# Patient Record
Sex: FEMALE | Race: WHITE | NOT HISPANIC OR LATINO | Employment: FULL TIME | URBAN - METROPOLITAN AREA
[De-identification: names, ages, dates, MRNs, and addresses within clinical notes are randomized per-mention and may not be internally consistent; named-entity substitution may affect disease eponyms.]

---

## 2022-09-20 ENCOUNTER — APPOINTMENT (OUTPATIENT)
Dept: RADIOLOGY | Facility: MEDICAL CENTER | Age: 32
DRG: 779 | End: 2022-09-20
Attending: EMERGENCY MEDICINE
Payer: COMMERCIAL

## 2022-09-20 ENCOUNTER — APPOINTMENT (OUTPATIENT)
Dept: RADIOLOGY | Facility: MEDICAL CENTER | Age: 32
DRG: 779 | End: 2022-09-20
Attending: OBSTETRICS & GYNECOLOGY
Payer: COMMERCIAL

## 2022-09-20 ENCOUNTER — HOSPITAL ENCOUNTER (INPATIENT)
Facility: MEDICAL CENTER | Age: 32
LOS: 1 days | DRG: 779 | End: 2022-09-21
Attending: EMERGENCY MEDICINE | Admitting: OBSTETRICS & GYNECOLOGY
Payer: COMMERCIAL

## 2022-09-20 DIAGNOSIS — D62 ACUTE BLOOD LOSS ANEMIA: ICD-10-CM

## 2022-09-20 DIAGNOSIS — N93.9 VAGINAL BLEEDING: ICD-10-CM

## 2022-09-20 DIAGNOSIS — O20.9 VAGINAL BLEEDING IN PREGNANCY, FIRST TRIMESTER: ICD-10-CM

## 2022-09-20 PROBLEM — O03.9 MISCARRIAGE: Status: ACTIVE | Noted: 2022-09-20

## 2022-09-20 LAB
ABO + RH BLD: NORMAL
ABO GROUP BLD: NORMAL
ALBUMIN SERPL BCP-MCNC: 3.8 G/DL (ref 3.2–4.9)
ALBUMIN/GLOB SERPL: 1.5 G/DL
ALP SERPL-CCNC: 34 U/L (ref 30–99)
ALT SERPL-CCNC: 41 U/L (ref 2–50)
ANION GAP SERPL CALC-SCNC: 12 MMOL/L (ref 7–16)
AST SERPL-CCNC: 18 U/L (ref 12–45)
B-HCG SERPL-ACNC: ABNORMAL MIU/ML (ref 0–5)
BASOPHILS # BLD AUTO: 0.4 % (ref 0–1.8)
BASOPHILS # BLD: 0.04 K/UL (ref 0–0.12)
BILIRUB SERPL-MCNC: 0.3 MG/DL (ref 0.1–1.5)
BLD GP AB SCN SERPL QL: NORMAL
BUN SERPL-MCNC: 8 MG/DL (ref 8–22)
CALCIUM SERPL-MCNC: 8.9 MG/DL (ref 8.5–10.5)
CHLORIDE SERPL-SCNC: 106 MMOL/L (ref 96–112)
CO2 SERPL-SCNC: 19 MMOL/L (ref 20–33)
CREAT SERPL-MCNC: 0.68 MG/DL (ref 0.5–1.4)
EOSINOPHIL # BLD AUTO: 0.07 K/UL (ref 0–0.51)
EOSINOPHIL NFR BLD: 0.8 % (ref 0–6.9)
ERYTHROCYTE [DISTWIDTH] IN BLOOD BY AUTOMATED COUNT: 41.1 FL (ref 35.9–50)
ERYTHROCYTE [DISTWIDTH] IN BLOOD BY AUTOMATED COUNT: 41.3 FL (ref 35.9–50)
GFR SERPLBLD CREATININE-BSD FMLA CKD-EPI: 117 ML/MIN/1.73 M 2
GLOBULIN SER CALC-MCNC: 2.5 G/DL (ref 1.9–3.5)
GLUCOSE SERPL-MCNC: 129 MG/DL (ref 65–99)
HCT VFR BLD AUTO: 27.8 % (ref 37–47)
HCT VFR BLD AUTO: 32.5 % (ref 37–47)
HGB BLD-MCNC: 11 G/DL (ref 12–16)
HGB BLD-MCNC: 9.6 G/DL (ref 12–16)
IMM GRANULOCYTES # BLD AUTO: 0.06 K/UL (ref 0–0.11)
IMM GRANULOCYTES NFR BLD AUTO: 0.7 % (ref 0–0.9)
LYMPHOCYTES # BLD AUTO: 1.54 K/UL (ref 1–4.8)
LYMPHOCYTES NFR BLD: 16.8 % (ref 22–41)
MCH RBC QN AUTO: 27.6 PG (ref 27–33)
MCH RBC QN AUTO: 28.2 PG (ref 27–33)
MCHC RBC AUTO-ENTMCNC: 33.8 G/DL (ref 33.6–35)
MCHC RBC AUTO-ENTMCNC: 34.5 G/DL (ref 33.6–35)
MCV RBC AUTO: 81.5 FL (ref 81.4–97.8)
MCV RBC AUTO: 81.7 FL (ref 81.4–97.8)
MONOCYTES # BLD AUTO: 0.39 K/UL (ref 0–0.85)
MONOCYTES NFR BLD AUTO: 4.3 % (ref 0–13.4)
NEUTROPHILS # BLD AUTO: 7.06 K/UL (ref 2–7.15)
NEUTROPHILS NFR BLD: 77 % (ref 44–72)
NRBC # BLD AUTO: 0 K/UL
NRBC BLD-RTO: 0 /100 WBC
NUMBER OF RH DOSES IND 8505RD: NORMAL
PLATELET # BLD AUTO: 301 K/UL (ref 164–446)
PLATELET # BLD AUTO: 346 K/UL (ref 164–446)
PMV BLD AUTO: 9.6 FL (ref 9–12.9)
PMV BLD AUTO: 9.7 FL (ref 9–12.9)
POTASSIUM SERPL-SCNC: 3.3 MMOL/L (ref 3.6–5.5)
PROT SERPL-MCNC: 6.3 G/DL (ref 6–8.2)
RBC # BLD AUTO: 3.41 M/UL (ref 4.2–5.4)
RBC # BLD AUTO: 3.98 M/UL (ref 4.2–5.4)
RH BLD: NORMAL
RH BLD: NORMAL
SODIUM SERPL-SCNC: 137 MMOL/L (ref 135–145)
WBC # BLD AUTO: 12.1 K/UL (ref 4.8–10.8)
WBC # BLD AUTO: 9.2 K/UL (ref 4.8–10.8)

## 2022-09-20 PROCEDURE — 86901 BLOOD TYPING SEROLOGIC RH(D): CPT

## 2022-09-20 PROCEDURE — 700111 HCHG RX REV CODE 636 W/ 250 OVERRIDE (IP): Performed by: EMERGENCY MEDICINE

## 2022-09-20 PROCEDURE — 76801 OB US < 14 WKS SINGLE FETUS: CPT

## 2022-09-20 PROCEDURE — 85027 COMPLETE CBC AUTOMATED: CPT

## 2022-09-20 PROCEDURE — 96374 THER/PROPH/DIAG INJ IV PUSH: CPT

## 2022-09-20 PROCEDURE — 80053 COMPREHEN METABOLIC PANEL: CPT

## 2022-09-20 PROCEDURE — 85025 COMPLETE CBC W/AUTO DIFF WBC: CPT

## 2022-09-20 PROCEDURE — 86900 BLOOD TYPING SEROLOGIC ABO: CPT

## 2022-09-20 PROCEDURE — 700102 HCHG RX REV CODE 250 W/ 637 OVERRIDE(OP): Performed by: EMERGENCY MEDICINE

## 2022-09-20 PROCEDURE — 86850 RBC ANTIBODY SCREEN: CPT

## 2022-09-20 PROCEDURE — 99285 EMERGENCY DEPT VISIT HI MDM: CPT

## 2022-09-20 PROCEDURE — 36415 COLL VENOUS BLD VENIPUNCTURE: CPT

## 2022-09-20 PROCEDURE — 700105 HCHG RX REV CODE 258: Performed by: EMERGENCY MEDICINE

## 2022-09-20 PROCEDURE — 770001 HCHG ROOM/CARE - MED/SURG/GYN PRIV*

## 2022-09-20 PROCEDURE — A9270 NON-COVERED ITEM OR SERVICE: HCPCS | Performed by: EMERGENCY MEDICINE

## 2022-09-20 PROCEDURE — 84702 CHORIONIC GONADOTROPIN TEST: CPT

## 2022-09-20 RX ORDER — ACETAMINOPHEN 500 MG
1000 TABLET ORAL ONCE
Status: COMPLETED | OUTPATIENT
Start: 2022-09-20 | End: 2022-09-20

## 2022-09-20 RX ORDER — ONDANSETRON 2 MG/ML
4 INJECTION INTRAMUSCULAR; INTRAVENOUS ONCE
Status: COMPLETED | OUTPATIENT
Start: 2022-09-20 | End: 2022-09-20

## 2022-09-20 RX ORDER — SODIUM CHLORIDE 9 MG/ML
INJECTION, SOLUTION INTRAVENOUS CONTINUOUS
Status: DISCONTINUED | OUTPATIENT
Start: 2022-09-20 | End: 2022-09-21 | Stop reason: HOSPADM

## 2022-09-20 RX ADMIN — ONDANSETRON 4 MG: 2 INJECTION INTRAMUSCULAR; INTRAVENOUS at 17:28

## 2022-09-20 RX ADMIN — SODIUM CHLORIDE: 9 INJECTION, SOLUTION INTRAVENOUS at 19:00

## 2022-09-20 RX ADMIN — ACETAMINOPHEN 1000 MG: 500 TABLET ORAL at 17:29

## 2022-09-20 RX ADMIN — SODIUM CHLORIDE: 9 INJECTION, SOLUTION INTRAVENOUS at 17:00

## 2022-09-20 ASSESSMENT — LIFESTYLE VARIABLES
ALCOHOL_USE: NO
HAVE YOU EVER FELT YOU SHOULD CUT DOWN ON YOUR DRINKING: NO
EVER HAD A DRINK FIRST THING IN THE MORNING TO STEADY YOUR NERVES TO GET RID OF A HANGOVER: NO
HOW MANY TIMES IN THE PAST YEAR HAVE YOU HAD 5 OR MORE DRINKS IN A DAY: 0
CONSUMPTION TOTAL: NEGATIVE
ON A TYPICAL DAY WHEN YOU DRINK ALCOHOL HOW MANY DRINKS DO YOU HAVE: 0
TOTAL SCORE: 0
EVER FELT BAD OR GUILTY ABOUT YOUR DRINKING: NO
AVERAGE NUMBER OF DAYS PER WEEK YOU HAVE A DRINK CONTAINING ALCOHOL: 0
HAVE PEOPLE ANNOYED YOU BY CRITICIZING YOUR DRINKING: NO

## 2022-09-20 ASSESSMENT — COGNITIVE AND FUNCTIONAL STATUS - GENERAL
SUGGESTED CMS G CODE MODIFIER DAILY ACTIVITY: CH
MOBILITY SCORE: 24
DAILY ACTIVITIY SCORE: 24
SUGGESTED CMS G CODE MODIFIER MOBILITY: CH

## 2022-09-20 ASSESSMENT — PATIENT HEALTH QUESTIONNAIRE - PHQ9
2. FEELING DOWN, DEPRESSED, IRRITABLE, OR HOPELESS: NOT AT ALL
1. LITTLE INTEREST OR PLEASURE IN DOING THINGS: NOT AT ALL
SUM OF ALL RESPONSES TO PHQ9 QUESTIONS 1 AND 2: 0

## 2022-09-20 ASSESSMENT — PAIN DESCRIPTION - PAIN TYPE: TYPE: ACUTE PAIN

## 2022-09-20 ASSESSMENT — PAIN SCALES - WONG BAKER: WONGBAKER_NUMERICALRESPONSE: DOESN'T HURT AT ALL

## 2022-09-20 NOTE — ED PROVIDER NOTES
"ED Provider Note    CHIEF COMPLAINT  Chief Complaint   Patient presents with    Vaginal Bleeding       HPI  Martha Carissa Rubio is a 32 y.o. female who presents complaining vaginal bleeding.  She is a G4,  (molar) at 6 and half weeks by dates.  She had some bleeding and was seen a week ago but no [facility].  There she had a gestational sac with no fetal pole identified in the uterus, 5 weeks 3 days.  She had an associated small subchorionic hemorrhage.  Her bleeding got better but then got worse once again, she was presents here concerned about that bleeding.  She has not really been able to quantify it but it is more than the menstrual cycle.  She denies any fever.  She does have some dizziness when she stands.  Denies any change in bowel or bladder.  There is no other complaint.    PAST MEDICAL HISTORY  As above    FAMILY HISTORY  History reviewed. No pertinent family history.    SOCIAL HISTORY  Social History     Tobacco Use    Smoking status: Never    Smokeless tobacco: Never   Substance Use Topics    Alcohol use: Not Currently    Drug use: Not Currently       SURGICAL HISTORY  History reviewed. No pertinent surgical history.    CURRENT MEDICATIONS  Home Medications       Reviewed by Natalie Howe R.N. (Registered Nurse) on 22 at 1252  Med List Status: Complete     Medication Last Dose Status        Patient Nuno Taking any Medications                           I have reviewed the nurses notes and/or the list brought with the patient.    ALLERGIES  Allergies   Allergen Reactions    Codeine     Penicillins        REVIEW OF SYSTEMS  See HPI for further details. Review of systems as above, otherwise all other systems are negative.     PHYSICAL EXAM  VITAL SIGNS: /61   Pulse 95   Temp 36.3 °C (97.4 °F) (Temporal)   Resp 16   Ht 1.676 m (5' 6\")   Wt 107 kg (236 lb 5.3 oz)   LMP 2022 (Exact Date)   SpO2 95%   BMI 38.15 kg/m²     Constitutional: Well appearing patient " in no acute distress.  Not toxic, nor ill in appearance.  HENT: Mucus membranes moist.  Oropharynx is clear.  Eyes: Pupils equally round.  No scleral icterus.   Neck: Full nontender range of motion.  Lymphatic: No cervical lymphadenopathy noted.   Cardiovascular: Tachycardia is noted in triage.  At the bedside heart rate is  on the monitor.  No murmurs, rubs, nor gallop appreciated.   Thorax & Lungs: Chest is nontender.  Lungs are clear to auscultation with good air movement bilaterally.  No wheeze, rhonchi, nor rales.   Abdomen: Soft, with no tenderness, rebound nor guarding.  No mass, pulsatile mass, nor hepatosplenomegaly appreciated.  : Ros[employer] tech as chaperone.  Normal external genitalia.  There is blood in the vault.  The cervix is closed.  There is no cervical motion tenderness.  There is no adnexal mass or tenderness.  Skin: No purpura nor petechia noted.  Extremities/Musculoskeletal: No sign of trauma.  Calves are nontender with no cords nor edema.  No Justin's sign.  Pulses are intact all around.   Neurologic: Alert & oriented.  Strength and sensation is intact all around.  Gait is normal.  Psychiatric: Normal affect appropriate for the clinical situation.    LABS  Labs Reviewed   CBC WITH DIFFERENTIAL - Abnormal; Notable for the following components:       Result Value    RBC 3.98 (*)     Hemoglobin 11.0 (*)     Hematocrit 32.5 (*)     Neutrophils-Polys 77.00 (*)     Lymphocytes 16.80 (*)     All other components within normal limits   COMP METABOLIC PANEL - Abnormal; Notable for the following components:    Potassium 3.3 (*)     Co2 19 (*)     Glucose 129 (*)     All other components within normal limits   HCG QUANTITATIVE - Abnormal; Notable for the following components:    Bhcg 46378.0 (*)     All other components within normal limits   RH TYPE FOR RHOGAM FROM E.D.    Narrative:     Print Consent?->No   ESTIMATED GFR   URINALYSIS,CULTURE IF INDICATED   COD (ADULT)          RADIOLOGY/PROCEDURES  I have reviewed the patient's film interpretations myself, and they are read out by the radiologist as:   US-OB 1ST TRIMESTER WITH TRANSVAGINAL (COMBO)   Final Result      1.  Single living intrauterine pregnancy within an irregularly shaped gestational sac located within the cervix suggesting  in progress versus cervical ectopic.   2.  Additional cystic structures/sac within the uterus with questionable small internal contents for which additional pregnancy cannot be excluded. Recommend second look ultrasound at this area. There is possibly subchorionic hemorrhage adjacent to this.   These findings were discussed with TYRESE DEAN on 2022 3:30 PM.           .    MEDICAL RECORD  I have reviewed patient's medical record and pertinent results are listed above.    COURSE & MEDICAL DECISION MAKING  I have reviewed any medical record information, laboratory studies and radiographic results as noted above.  Patient presents about 6 weeks by dates, ultrasound a week ago showing gestational sac but no fetal pole.  She presents here with increasing bleeding.  Her hCG has risen to 98,000 from 30,000.  But her hematocrit is dropped from 40.5-32.5.  I got the results as noted above the radiologist is concerned about either miscarriage in process versus cervical ectopic.  Given this with her blood loss, I called discussed the patient's case with Dr. Alexander from gynecology.  She will be by to see the patient.  At this time disposition is pending her input.  It sounds like radiology also wants a more films, I will defer that to them.     At this time the patient is placed into ED observation status, and her disposition will be based upon Dr. Alexander's assessment.    For now, have asked the patient be kept n.p.o., we will go ahead and put an IV in and start IV fluids.    I discussed all this with the patient, who expresses understanding.  Her  who I initially met was not at the  bedside as he went home to take care of the kids.      FINAL IMPRESSION  1. Vaginal bleeding    2. Acute blood loss anemia    3.  Miscarriage in process versus cervical ectopic pregnancy     This dictation was created using voice recognition software.    Electronically signed by: Champ Moon M.D., 9/20/2022 3:44 PM

## 2022-09-20 NOTE — DISCHARGE SUMMARY
ED Observation Discharge Summary  Patient:Martha Rubio  Patient : 1990  Patient MRN: [id number]  Patient PCP: Pcp Pt States None    Admit Date: 2022  Discharge Date and Time: 22 4:58 PM  Discharge Diagnosis: vaginal bleeding, miscarriage, anemia  Discharge Attending: Glendy Paz M.D.  Discharge Service: ED Observation    ED Course  Patient is a 32-year-old woman  4, para 2 A1 (molar) about 6 and half weeks presenting with heavy vaginal bleeding and lightheadedness.  Patient is signed out to me for follow-up by Dr. Moon, patient has been placed in ED opts for OB evaluation and repeat H&H.  Patient was seen at [facility] 2022 for vaginal bleeding and clots.  At that time, H&H was 13.4/40.5, Rh+, beta hCG 30,921, ultrasound showed gestational sac with yolk sac and no fetal pole identified with an estimated age of pregnancy about 5 weeks and 3 days.  There was a small subchorionic hemorrhage at that time.  Today's labs show a decrease in her H&H at 11/32.5.  Patient had been complaining of lightheadedness and fatigue.  She did fall but did not pass out earlier today.  Radiology reviewed ultrasound and shows a viable IUP as well as a possible cervical ectopic pregnancy.  Dr. Aubrie Alexander did evaluate the patient and reviewed the ultrasound, she feels that this is likely a twin gestation with 1 actively miscarrying and 1 remaining currently viable.  She would like to continue IV fluids and repeat H&H.  If patient's H&H remained stable and vital signs remain normal with patient asymptomatic, she feels comfortable with discharge and follow-up in her clinic in the next 1 to 2 days.  Patient was given 1 L IV fluids.  Patient did appear pale but improved with IV fluids.  Repeat H&H with continued drop at 9.6/27.8.  Heart rate normal, blood pressure normal, patient has improved symptoms and states that her last urination there was not as much blood  "as previous.  I am still concerned with the continued drop of her H&H and feel that admission and observation is appropriate.  Patient is comfortable this plan.  Discussed with Dr. Alexander, OB/GYN, who agrees with admission.  Patient is admitted to the OB/GYN service in stable condition.    Discharge Exam:  /50   Pulse 85   Temp 36.3 °C (97.4 °F) (Temporal)   Resp 16   Ht 1.676 m (5' 6\")   Wt 107 kg (236 lb 5.3 oz)   LMP 08/08/2022 (Exact Date)   SpO2 95%   BMI 38.15 kg/m² .    Constitutional: Awake and alert. Nontoxic  HENT: Atraumatic, mucous murmurs are moist  Eyes: No icterus  Neck: Supple  Cardiovascular: Normal heart rate   Thorax & Lungs: Breathing easily  Abdomen: Nontender  Skin:  No visible rash  Extremities: Well perfused  Psychiatric: Affect normal    Labs  Results for orders placed or performed during the hospital encounter of 09/20/22   CBC WITH DIFFERENTIAL   Result Value Ref Range    WBC 9.2 4.8 - 10.8 K/uL    RBC 3.98 (L) 4.20 - 5.40 M/uL    Hemoglobin 11.0 (L) 12.0 - 16.0 g/dL    Hematocrit 32.5 (L) 37.0 - 47.0 %    MCV 81.7 81.4 - 97.8 fL    MCH 27.6 27.0 - 33.0 pg    MCHC 33.8 33.6 - 35.0 g/dL    RDW 41.1 35.9 - 50.0 fL    Platelet Count 346 164 - 446 K/uL    MPV 9.7 9.0 - 12.9 fL    Neutrophils-Polys 77.00 (H) 44.00 - 72.00 %    Lymphocytes 16.80 (L) 22.00 - 41.00 %    Monocytes 4.30 0.00 - 13.40 %    Eosinophils 0.80 0.00 - 6.90 %    Basophils 0.40 0.00 - 1.80 %    Immature Granulocytes 0.70 0.00 - 0.90 %    Nucleated RBC 0.00 /100 WBC    Neutrophils (Absolute) 7.06 2.00 - 7.15 K/uL    Lymphs (Absolute) 1.54 1.00 - 4.80 K/uL    Monos (Absolute) 0.39 0.00 - 0.85 K/uL    Eos (Absolute) 0.07 0.00 - 0.51 K/uL    Baso (Absolute) 0.04 0.00 - 0.12 K/uL    Immature Granulocytes (abs) 0.06 0.00 - 0.11 K/uL    NRBC (Absolute) 0.00 K/uL   COMP METABOLIC PANEL   Result Value Ref Range    Sodium 137 135 - 145 mmol/L    Potassium 3.3 (L) 3.6 - 5.5 mmol/L    Chloride 106 96 - 112 mmol/L    Co2 " 19 (L) 20 - 33 mmol/L    Anion Gap 12.0 7.0 - 16.0    Glucose 129 (H) 65 - 99 mg/dL    Bun 8 8 - 22 mg/dL    Creatinine 0.68 0.50 - 1.40 mg/dL    Calcium 8.9 8.5 - 10.5 mg/dL    AST(SGOT) 18 12 - 45 U/L    ALT(SGPT) 41 2 - 50 U/L    Alkaline Phosphatase 34 30 - 99 U/L    Total Bilirubin 0.3 0.1 - 1.5 mg/dL    Albumin 3.8 3.2 - 4.9 g/dL    Total Protein 6.3 6.0 - 8.2 g/dL    Globulin 2.5 1.9 - 3.5 g/dL    A-G Ratio 1.5 g/dL   RH TYPE FOR RHOGAM FROM E.D.   Result Value Ref Range    Emergency Department Rh Typing POS     Number Of Rh Doses Indicated ZERO    HCG QUANTITATIVE   Result Value Ref Range    Bhcg 13212.0 (H) 0.0 - 5.0 mIU/mL   ESTIMATED GFR   Result Value Ref Range    GFR (CKD-EPI) 117 >60 mL/min/1.73 m 2   COD (ADULT)   Result Value Ref Range    ABO Grouping Only A     Rh Grouping Only POS     Antibody Screen-Cod NEG    ABO Rh Confirm   Result Value Ref Range    ABO Rh Confirm A POS    CBC WITHOUT DIFFERENTIAL   Result Value Ref Range    WBC 12.1 (H) 4.8 - 10.8 K/uL    RBC 3.41 (L) 4.20 - 5.40 M/uL    Hemoglobin 9.6 (L) 12.0 - 16.0 g/dL    Hematocrit 27.8 (L) 37.0 - 47.0 %    MCV 81.5 81.4 - 97.8 fL    MCH 28.2 27.0 - 33.0 pg    MCHC 34.5 33.6 - 35.0 g/dL    RDW 41.3 35.9 - 50.0 fL    Platelet Count 301 164 - 446 K/uL    MPV 9.6 9.0 - 12.9 fL       Radiology  US-OB TWINS-1ST TRIMESTER   Final Result   Addendum (preliminary) 2 of 2   Addendum:      Repeat imaging of the intrauterine sac demonstrates a more    normal-appearing gestational sac containing a yolk sac and fetal pole.    Crown-rump length measures 0.43 cm projecting to a 6 week 1 day gestation.    Fetal heart rate 117 bpm. There is a small    subchorionic hemorrhage adjacent to this.      These findings were discussed with Dr Paz on 2022 4:31 PM.      Final      1.  Single living intrauterine pregnancy within an irregularly shaped gestational sac located within the cervix suggesting  in progress versus cervical ectopic.   2.   Additional cystic structures/sac within the uterus with questionable small internal contents for which additional pregnancy cannot be excluded. Recommend second look ultrasound at this area. There is possibly subchorionic hemorrhage adjacent to this.   These findings were discussed with TYRESE DEAN on 9/20/2022 3:30 PM.             Disposition: Admit to OB/GYN service under Dr. Alexander    Follow up: No follow-ups on file.    Medications:   New Prescriptions    No medications on file       Discharge Condition: Stable    Electronically signed by: Glendy Paz M.D., 9/20/2022 4:58 PM

## 2022-09-20 NOTE — PROGRESS NOTES
Spiritual Care Note    Patient Information     Patient's Name: Martha Rubio   MRN: [id number]    YOB: 1990   Age and Gender: 32 y.o. female   Service Area: ED C   Room (and Bed): [address]   Ethnicity or Nationality:     Primary Language: English   Yazdanism/Spiritual preference: Denominational   Place of Residence: [place]   Family/Friends/Others Present: No   Clinical Team Present: No   Medical Diagnosis(-es)/Procedure(s):    Code Status: No Order    Date of Admission: 9/20/2022   Length of Stay: 0 days        Spiritual Care Provider Information:  Name of Spiritual Care Provider: Clarissa Ortiz  Title of Spiritual Care Provider: Volunteer   Phone Number: [phone]  E-mail: [email or web address]   Total time : 10 minutes    Spiritual Screen Results:    Gen Nursing        Palliative Care         Encounter/Request Information  Encounter/Request Type   Visited With: Patient  Nature of the Visit: Initial, On shift  General Visit: Yes  Referral From/ Origin of Request: [place] rounds, Verbal patient    Religous Needs/Values  Yazdanism Needs Visit  Yazdanism Needs: Prayer    Spiritual Assessment   Spiritual Care Encounters  Observations/Symptoms: Accepting, Thankfulness  Interacton/Conversation: Pt requested prayer for herself and her unborn child, and thanked the .  Assessment: Need  Need: Seeking Spiritual Assistance and Support  Interventions: Compassionate presence, prayer.  Outcomes: Spiritual Comfort  Plan: Visit Upon Request    Notes:

## 2022-09-20 NOTE — ED TRIAGE NOTES
Pt ambulatory to triage c/o vaginal bleeding that began last week and went to [facility] diagnosed with subchorionic hemorrhge (pt is approx 6 weeks pregnant) and dc'd home. Pt states yesterday began to bleed more heavily and passing large clots. Pt denies pain. Pt states she is unsure of how many pads she is going through an hour. Pt given 2 additional pads if needed and told to let me know if she goes through them within the hour. Nad.

## 2022-09-21 VITALS
OXYGEN SATURATION: 90 % | BODY MASS INDEX: 37.98 KG/M2 | HEIGHT: 66 IN | DIASTOLIC BLOOD PRESSURE: 58 MMHG | RESPIRATION RATE: 18 BRPM | SYSTOLIC BLOOD PRESSURE: 100 MMHG | TEMPERATURE: 97.5 F | WEIGHT: 236.33 LBS | HEART RATE: 87 BPM

## 2022-09-21 LAB
BASOPHILS # BLD AUTO: 0.4 % (ref 0–1.8)
BASOPHILS # BLD: 0.04 K/UL (ref 0–0.12)
EOSINOPHIL # BLD AUTO: 0.06 K/UL (ref 0–0.51)
EOSINOPHIL NFR BLD: 0.6 % (ref 0–6.9)
ERYTHROCYTE [DISTWIDTH] IN BLOOD BY AUTOMATED COUNT: 42.4 FL (ref 35.9–50)
ERYTHROCYTE [DISTWIDTH] IN BLOOD BY AUTOMATED COUNT: 42.9 FL (ref 35.9–50)
HCT VFR BLD AUTO: 24.5 % (ref 37–47)
HCT VFR BLD AUTO: 26.2 % (ref 37–47)
HGB BLD-MCNC: 8.2 G/DL (ref 12–16)
HGB BLD-MCNC: 8.9 G/DL (ref 12–16)
IMM GRANULOCYTES # BLD AUTO: 0.08 K/UL (ref 0–0.11)
IMM GRANULOCYTES NFR BLD AUTO: 0.8 % (ref 0–0.9)
LYMPHOCYTES # BLD AUTO: 1.64 K/UL (ref 1–4.8)
LYMPHOCYTES NFR BLD: 16.2 % (ref 22–41)
MCH RBC QN AUTO: 27.9 PG (ref 27–33)
MCH RBC QN AUTO: 28.1 PG (ref 27–33)
MCHC RBC AUTO-ENTMCNC: 33.5 G/DL (ref 33.6–35)
MCHC RBC AUTO-ENTMCNC: 34 G/DL (ref 33.6–35)
MCV RBC AUTO: 82.6 FL (ref 81.4–97.8)
MCV RBC AUTO: 83.3 FL (ref 81.4–97.8)
MONOCYTES # BLD AUTO: 0.84 K/UL (ref 0–0.85)
MONOCYTES NFR BLD AUTO: 8.3 % (ref 0–13.4)
NEUTROPHILS # BLD AUTO: 7.48 K/UL (ref 2–7.15)
NEUTROPHILS NFR BLD: 73.7 % (ref 44–72)
NRBC # BLD AUTO: 0 K/UL
NRBC BLD-RTO: 0 /100 WBC
PLATELET # BLD AUTO: 236 K/UL (ref 164–446)
PLATELET # BLD AUTO: 268 K/UL (ref 164–446)
PMV BLD AUTO: 9.1 FL (ref 9–12.9)
PMV BLD AUTO: 9.5 FL (ref 9–12.9)
RBC # BLD AUTO: 2.94 M/UL (ref 4.2–5.4)
RBC # BLD AUTO: 3.17 M/UL (ref 4.2–5.4)
WBC # BLD AUTO: 10.1 K/UL (ref 4.8–10.8)
WBC # BLD AUTO: 7.9 K/UL (ref 4.8–10.8)

## 2022-09-21 PROCEDURE — 36415 COLL VENOUS BLD VENIPUNCTURE: CPT

## 2022-09-21 PROCEDURE — 85027 COMPLETE CBC AUTOMATED: CPT

## 2022-09-21 PROCEDURE — 85025 COMPLETE CBC W/AUTO DIFF WBC: CPT

## 2022-09-21 RX ORDER — ACETAMINOPHEN 500 MG
1000 TABLET ORAL EVERY 6 HOURS PRN
Qty: 30 TABLET | Refills: 0 | Status: ON HOLD | COMMUNITY
Start: 2022-09-21 | End: 2022-12-05

## 2022-09-21 RX ORDER — ACETAMINOPHEN 500 MG
1000 TABLET ORAL EVERY 6 HOURS PRN
Status: DISCONTINUED | OUTPATIENT
Start: 2022-09-21 | End: 2022-09-21 | Stop reason: HOSPADM

## 2022-09-21 RX ORDER — FERROUS SULFATE 325(65) MG
325 TABLET ORAL
Qty: 60 TABLET | Refills: 0 | Status: ON HOLD | OUTPATIENT
Start: 2022-09-21 | End: 2024-01-31

## 2022-09-21 ASSESSMENT — PAIN DESCRIPTION - PAIN TYPE
TYPE: ACUTE PAIN
TYPE: ACUTE PAIN

## 2022-09-21 NOTE — CONSULTS
DATE OF SERVICE:  2022     CONSULTING PHYSICIAN:  Aubrie Alexander MD     ATTENDING PHYSICIAN:  Champ Moon MD     IDENTIFICATION:  This is a 32-year-old  6, para 2 female who is here   with vaginal bleeding in the setting of an early pregnancy.     HISTORY OF PRESENT ILLNESS:  The patient is a 32-year-old  6, para 2   female.  She has a history of 2 previous term vaginal deliveries.  Those kids   are 13 and 11 now.  She has had a miscarriage and termination last summer, she   had a molar pregnancy that was diagnosed at [facility] that she   underwent D and C for and reports she followed, her hormone levels were   followed to less than 5 and then she was lost to follow up.  She has had   normal periods since that time until recently when she missed her period and   got a positive pregnancy test at home approximately 1 week ago, she started   having some bleeding and presented to [facility] Emergency room. An   ultrasound was done and intrauterine pregnancy was identified with a   gestational sac and subchorionic hemorrhage.  No fetal pole or yolk sac was   seen.  She was instructed to follow up over the course of the past week. She   has had ongoing bleeding. Yesterday it picked up and it was heavier.  She felt   lightheaded.  This continued today, which prompted her to present to the   Emergency Room.  She reports she has been passing large clots.  Sometimes she   feels fine, but other times she feels dizzy.  The bleeding is not always   heavy, it seems to wax and wane.     PAST MEDICAL HISTORY:  She denies.     PAST SURGICAL HISTORY:  D and C only.     OBSTETRICAL HISTORY:  1.  Pregnancy #1 term vaginal delivery.  2.  Miscarriage at 8 weeks.  3.  Vaginal delivery at term.  4.  Elective termination of pregnancy.  5.  Molar pregnancy requiring D and C.  6.  This is her sixth and current pregnancy.     GYNECOLOGIC HISTORY:  She denies history of abnormal Pap smears or sexually    transmitted infections.  She is not sure when her last Pap smear was.  She   thinks it was with [facility] last summer.     SOCIAL HISTORY:  She is here with her significant other.  She denies the use   of tobacco, alcohol or drugs since finding out she was pregnant.  She was   vaping prior to pregnancy.     PHYSICAL EXAMINATION:  VITAL SIGNS:  Pulse was in the 80s, blood pressure 107/55.  She is afebrile.  GENERAL:  She is pleasant and cooperative, appears her stated age.  LUNGS:  Breathing is unlabored.  HEART:  Regular rate and rhythm.  ABDOMEN:  Soft, mildly obese, nontender on palpation.  PELVIC:  Speculum exam was performed and there was a large clot removed from   the cervix.  The cervix appeared to be about 1 cm dilated.  I could not see   any membranes.     LABORATORY RESULTS:  Hemoglobin 11, hematocrit 32.5, her hematocrit was 40   last week, platelet count 346,000.  Chemistry panel is normal.  Beta hCG   98,492.  Blood type is A positive. Antibody screen was negative.     DIAGNOSTIC DATA:  Ultrasound was done. There are two gestational sacs seen.   Gestational sac #1 is seen within the cervix.  It is irregular, elongated and   contains a yolk sac and fetal pole.  There is cardiac motion noted with a   heart rate of 110 and a crown rump length is consistent with 6 weeks 1 day.    There is a second gestational sac more towards the fundus with a fetal pole   with a fetal heart rate that is present 117 with a small subchorionic   hemorrhage.     ASSESSMENT AND PLAN:  A 32-year-old  6, para 2 female here with twin   intrauterine pregnancy with inevitable miscarriage of twin A vs cervical ectopic though less likely and subchorionic hemorrhage with twin B and probable   miscarriage of twin B.  She is Rh positive.  She is currently hemodynamically   stable.      I have discussed these findings with the patient and her significant   other.  I have reviewed her options including expectant  management versus   medical management with Cytotec versus management in the operating room with D   and C.  The patient asked if it was possible that she would pass the first   twin and if it was possible for the second twin to stay on and be a viable   pregnancy.  We discussed that this is unlikely, but there is a chance that   could happen. After reviewing that information, the patient is opting for   expectant management in hopes that she could at least retain one of the   babies.  I have recommended we observe her over the next several hours.  She   has declined any medical management.  We will give her some IV fluids and   recheck an H and H in a couple of hours.  If she is stable, she is comfortable   going home.  She understands she will likely pass more clots and tissue at   home and that she would need to come back if she felt lightheaded or dizzy or   had uncontrollable pain.  Otherwise, if she was stable, she could follow up as   an outpatient in our office.  She already has an appointment to get   established with Dr. Jauregui on the 27th and then we could likely find a spot   sooner than that in our office.  The patient's questions were answered.  I   have discussed this with Dr. Paz who took over for Dr. Moon.  She   understands the plan as well.    Addendum: after several hours, pt reports feeling okay but hemoglobin has dropped and will be admitted for further observation.         ______________________________  MD JAMIR Lovett/RENZO/ADRI    DD:  09/20/2022 17:19  DT:  09/20/2022 19:47    Job#:  [id number]

## 2022-09-21 NOTE — ED NOTES
Pt transferred to T435 bed 2, with transport.  On RA, A&O x4, belongings and chart sent with patient.

## 2022-09-21 NOTE — PROGRESS NOTES
4 Eyes Skin Assessment Completed by DAVIAN Tyson and DAVIAN Cueto.    Head WDL  Ears WDL  Nose WDL  Mouth WDL  Neck WDL  Breast/Chest WDL  Shoulder Blades WDL  Spine WDL  (R) Arm/Elbow/Hand WDL  (L) Arm/Elbow/Hand WDL  Abdomen WDL  Groin WDL  Scrotum/Coccyx/Buttocks WDL  (R) Leg WDL  (L) Leg WDL  (R) Heel/Foot/Toe WDL  (L) Heel/Foot/Toe WDL    Possible Skin Injury No    Pictures Uploaded Into Epic N/A  Wound Consult Placed N/A  RN Wound Prevention Protocol Ordered No

## 2022-09-21 NOTE — DISCHARGE SUMMARY
Discharge Summary:     Date of Admission: 2022  Date of Discharge: 22      Admitting diagnosis:    1. 6week twin pregnancy w/ inevitable   2. Subchorionic hematoma    Discharge Diagnosis:   1. 6w5d remaining twin with +FHTs, loss of twin A  2. Subchorionic hematoma    Pmhx: denies    OBSTETRICAL HISTORY:  1.  Pregnancy #1 term vaginal delivery.  2.  Miscarriage at 8 weeks.  3.  Vaginal delivery at term.  4.  Elective termination of pregnancy.  5.  Molar pregnancy requiring D and C.  6.  This is her sixth and current pregnancy.    PSHx: D&C  Codeine and Penicillins    Hospital Course:   Pt is a 32 y.o. now  who presented for vaginal bleeding and was found to have suspected ongoing/inevitable spontaneous miscarriage.  US initially showed suspected twin pregnancy with miscarriage in process of twin A.  Repeat US showed 1 remaining intrauterine gestation measuring 6w5d and with +FHTs.  Bleeding was minimal and pt desired discharge with expectant management.  Close f/u with OB scheduled at time of discharge and pt given strict bleeding precautions.    Physical Exam:  Temp:  [36.4 °C (97.5 °F)-36.8 °C (98.2 °F)] 36.4 °C (97.5 °F)  Pulse:  [] 87  Resp:  [14-18] 18  BP: (100-129)/(50-70) 100/58  SpO2:  [90 %-98 %] 90 %  Gen: AAO, NAD  Abd: soft, NT, ND  Ext: NT, no edema    Current Facility-Administered Medications   Medication Dose    acetaminophen (TYLENOL) tablet 1,000 mg  1,000 mg    NS infusion         Recent Labs     22  1903 22  0336 22  1334   WBC 12.1* 10.1 7.9   RBC 3.41* 3.17* 2.94*   HEMOGLOBIN 9.6* 8.9* 8.2*   HEMATOCRIT 27.8* 26.2* 24.5*   MCV 81.5 82.6 83.3   MCH 28.2 28.1 27.9   MCHC 34.5 34.0 33.5*   RDW 41.3 42.4 42.9   PLATELETCT 301 268 236   MPV 9.6 9.5 9.1     Rh positive    Activity/ Discharge Instructions::   Discharge to home  Pelvic Rest until cleared by primary OB  Call or come to ED for: heavy vaginal bleeding, fever >100.4, severe abdominal  pain, severe headache, chest pain, shortness of breath,  N/V, abnormal vaginal discharge, or other concerns.       Follow up:  tomorrow w/ Dr. Jauregui     Discharge Meds:      Medication List        START taking these medications        Instructions   acetaminophen 500 MG Tabs  Commonly known as: TYLENOL   Take 2 Tablets by mouth every 6 hours as needed for Mild Pain.  Dose: 1,000 mg     ferrous sulfate 325 (65 Fe) MG tablet   Take 1 Tablet by mouth 2 times a day.  Dose: 325 mg            CONTINUE taking these medications        Instructions   PRENATAL PO   Take 1 Tablet by mouth every day.  Dose: 1 Tablet                 Sue Hardin MD

## 2022-09-21 NOTE — CARE PLAN
The patient is Stable - Low risk of patient condition declining or worsening    Shift Goals  Clinical Goals: rest  Patient Goals: rest    Progress made toward(s) clinical / shift goals:    Problem: Knowledge Deficit - Standard  Goal: Patient and family/care givers will demonstrate understanding of plan of care, disease process/condition, diagnostic tests and medications  Outcome: Progressing

## 2022-09-21 NOTE — PROGRESS NOTES
Assumed care of pt at 0700. Report received and bedside rounding completed with night RN. Pt is calm no SOB, no acute distress noted.  Call light and pt belongings within reach - hourly rounding in place. See flowsheets for further assessment.

## 2022-09-21 NOTE — PROGRESS NOTES
OB PN    S/ pt got up to BR and had large amount of bleeding and passed large amounts of clot and tissue; since then has had minimal bleeding    O/  Vitals:    22 1600 22 1730 22 1800 22 1900   BP: 105/56 114/70 104/57 110/50   Pulse: 85 92 92 85   Resp:  16 14 16   Temp:       TempSrc:       SpO2: 97% 98% 97% 95%   Weight:       Height:          Gen-looks comfortable  Perineum- minimal blood on pad    .........  Recent Labs     22  1304 22  1903   WBC 9.2 12.1*   RBC 3.98* 3.41*   HEMOGLOBIN 11.0* 9.6*   HEMATOCRIT 32.5* 27.8*   MCV 81.7 81.5   MCH 27.6 28.2   RDW 41.1 41.3   PLATELETCT 346 301   MPV 9.7 9.6   NEUTSPOLYS 77.00*  --    LYMPHOCYTES 16.80*  --    MONOCYTES 4.30  --    EOSINOPHILS 0.80  --    BASOPHILS 0.40  --       A&P/ 31yo  with twin IUP, evaluated earlier for heavy bleeding, my dx was likely inevitable sab, pt opted for expectant management  H&H has dropped since initial values, her vitals remain stable  Will recheck TVUS to see if one or both of the gestational sacs have passed spontaneously  IF so, possible d/c to home with outpt followup  IF not, continue observation and reevaluation of options

## 2022-09-21 NOTE — ED NOTES
Patient attempted to walk to restroom. Became nauseous and felt sick to her stomach. SO helped her back to bed. Large amount of blood in the bed and on the floor. Informed ERP of nausea. Cleaned up patient's room and changed her gown and linens.

## 2022-09-21 NOTE — PROGRESS NOTES
Patient arrived to floor via gurney with transport.    Report received from ED RN, assumed Care.   Patient is AOx4, responds appropriately. Spouse at bedside.     Pain controlled at this time.  Patient is currently NPO, denies nausea/vomiting. + flatus  Up self with steady gait.    Plan of care discussed, all questions answered.    Educated on use of call light and importance of calling when assistance is required. Pt verbalizes understanding.    Call light and belongings within reach, treaded slipper socks on, bed in lowest locked position.  All needs met at this time.

## 2022-09-21 NOTE — PROGRESS NOTES
OB Progress Note:    Pt reports very light spotting currently.  No other concerns.    Temp:  [36.4 °C (97.5 °F)-36.8 °C (98.2 °F)] 36.4 °C (97.5 °F)  Pulse:  [] 87  Resp:  [14-18] 18  BP: (100-129)/(50-70) 100/58  SpO2:  [90 %-98 %] 90 %  Gen; AAO, NAD  Abd: soft, NT  Ext; no edema, NT     Latest Reference Range & Units 22 19:03 22 03:36   Hemoglobin 12.0 - 16.0 g/dL 9.6 (L) 8.9 (L)   Hematocrit 37.0 - 47.0 % 27.8 (L) 26.2 (L)   MCV 81.4 - 97.8 fL 81.5 82.6   MCH 27.0 - 33.0 pg 28.2 28.1   MCHC 33.6 - 35.0 g/dL 34.5 34.0   RDW 35.9 - 50.0 fL 41.3 42.4   Platelet Count 164 - 446 K/uL 301 268   (L): Data is abnormally low    33yo  here with VB at 6wks, miscarriage of twin A  - discussed w/ pt that prognosis for remaining twin is not good; pt desires to wait to see what happens.  Has f/u with Dr. Jauregui in office tomorrow.  Denies signs/sxs of anemia currently.  Will repeat Hgb now and if OK will plan to d/c with precautions.  Discussed to return to ED If soaking through >1 pad per hr.  Offered additional US today prior to discharge however pt OK with waiting until tomorrow to give it time and see what happens.    - pending repeat CBC, anticipate d/c home this afternoon.      Sue Hardin MD  [facility]

## 2022-10-03 LAB
C TRACH DNA GENITAL QL NAA+PROBE: NOT DETECTED
N GONORRHOEA DNA GENITAL QL NAA+PROBE: NOT DETECTED

## 2022-10-11 ENCOUNTER — HOSPITAL ENCOUNTER (EMERGENCY)
Facility: MEDICAL CENTER | Age: 32
End: 2022-10-11
Attending: EMERGENCY MEDICINE
Payer: COMMERCIAL

## 2022-10-11 ENCOUNTER — APPOINTMENT (OUTPATIENT)
Dept: RADIOLOGY | Facility: MEDICAL CENTER | Age: 32
End: 2022-10-11
Attending: EMERGENCY MEDICINE
Payer: COMMERCIAL

## 2022-10-11 ENCOUNTER — APPOINTMENT (OUTPATIENT)
Dept: RADIOLOGY | Facility: MEDICAL CENTER | Age: 32
End: 2022-10-11
Payer: COMMERCIAL

## 2022-10-11 VITALS
HEIGHT: 66 IN | SYSTOLIC BLOOD PRESSURE: 103 MMHG | WEIGHT: 232.81 LBS | HEART RATE: 99 BPM | TEMPERATURE: 97.6 F | RESPIRATION RATE: 18 BRPM | OXYGEN SATURATION: 100 % | BODY MASS INDEX: 37.41 KG/M2 | DIASTOLIC BLOOD PRESSURE: 65 MMHG

## 2022-10-11 DIAGNOSIS — O20.0 THREATENED MISCARRIAGE: ICD-10-CM

## 2022-10-11 LAB
ALBUMIN SERPL BCP-MCNC: 3.8 G/DL (ref 3.2–4.9)
ALBUMIN/GLOB SERPL: 1.3 G/DL
ALP SERPL-CCNC: 49 U/L (ref 30–99)
ALT SERPL-CCNC: 32 U/L (ref 2–50)
ANION GAP SERPL CALC-SCNC: 12 MMOL/L (ref 7–16)
ANISOCYTOSIS BLD QL SMEAR: ABNORMAL
AST SERPL-CCNC: 22 U/L (ref 12–45)
B-HCG SERPL-ACNC: ABNORMAL MIU/ML (ref 0–5)
BASOPHILS # BLD AUTO: 0 % (ref 0–1.8)
BASOPHILS # BLD: 0 K/UL (ref 0–0.12)
BILIRUB SERPL-MCNC: 0.2 MG/DL (ref 0.1–1.5)
BUN SERPL-MCNC: 9 MG/DL (ref 8–22)
BURR CELLS BLD QL SMEAR: NORMAL
CALCIUM SERPL-MCNC: 8.8 MG/DL (ref 8.5–10.5)
CHLORIDE SERPL-SCNC: 105 MMOL/L (ref 96–112)
CO2 SERPL-SCNC: 21 MMOL/L (ref 20–33)
CREAT SERPL-MCNC: 0.58 MG/DL (ref 0.5–1.4)
EKG IMPRESSION: NORMAL
EOSINOPHIL # BLD AUTO: 0.17 K/UL (ref 0–0.51)
EOSINOPHIL NFR BLD: 1.8 % (ref 0–6.9)
ERYTHROCYTE [DISTWIDTH] IN BLOOD BY AUTOMATED COUNT: 46.2 FL (ref 35.9–50)
GFR SERPLBLD CREATININE-BSD FMLA CKD-EPI: 121 ML/MIN/1.73 M 2
GLOBULIN SER CALC-MCNC: 3 G/DL (ref 1.9–3.5)
GLUCOSE SERPL-MCNC: 93 MG/DL (ref 65–99)
HCT VFR BLD AUTO: 26 % (ref 37–47)
HGB BLD-MCNC: 8.1 G/DL (ref 12–16)
LYMPHOCYTES # BLD AUTO: 1.01 K/UL (ref 1–4.8)
LYMPHOCYTES NFR BLD: 10.5 % (ref 22–41)
MANUAL DIFF BLD: NORMAL
MCH RBC QN AUTO: 26 PG (ref 27–33)
MCHC RBC AUTO-ENTMCNC: 31.2 G/DL (ref 33.6–35)
MCV RBC AUTO: 83.6 FL (ref 81.4–97.8)
MONOCYTES # BLD AUTO: 0.42 K/UL (ref 0–0.85)
MONOCYTES NFR BLD AUTO: 4.4 % (ref 0–13.4)
MORPHOLOGY BLD-IMP: NORMAL
NEUTROPHILS # BLD AUTO: 8 K/UL (ref 2–7.15)
NEUTROPHILS NFR BLD: 83.3 % (ref 44–72)
NRBC # BLD AUTO: 0.05 K/UL
NRBC BLD-RTO: 0.5 /100 WBC
NUMBER OF RH DOSES IND 8505RD: NORMAL
PLATELET # BLD AUTO: 374 K/UL (ref 164–446)
PLATELET BLD QL SMEAR: NORMAL
PMV BLD AUTO: 9.2 FL (ref 9–12.9)
POIKILOCYTOSIS BLD QL SMEAR: NORMAL
POLYCHROMASIA BLD QL SMEAR: NORMAL
POTASSIUM SERPL-SCNC: 3.6 MMOL/L (ref 3.6–5.5)
PROT SERPL-MCNC: 6.8 G/DL (ref 6–8.2)
RBC # BLD AUTO: 3.11 M/UL (ref 4.2–5.4)
RBC BLD AUTO: PRESENT
RH BLD: NORMAL
SODIUM SERPL-SCNC: 138 MMOL/L (ref 135–145)
WBC # BLD AUTO: 9.6 K/UL (ref 4.8–10.8)

## 2022-10-11 PROCEDURE — 80053 COMPREHEN METABOLIC PANEL: CPT

## 2022-10-11 PROCEDURE — 84702 CHORIONIC GONADOTROPIN TEST: CPT

## 2022-10-11 PROCEDURE — 93005 ELECTROCARDIOGRAM TRACING: CPT

## 2022-10-11 PROCEDURE — 99284 EMERGENCY DEPT VISIT MOD MDM: CPT

## 2022-10-11 PROCEDURE — 85007 BL SMEAR W/DIFF WBC COUNT: CPT

## 2022-10-11 PROCEDURE — 36415 COLL VENOUS BLD VENIPUNCTURE: CPT

## 2022-10-11 PROCEDURE — 86901 BLOOD TYPING SEROLOGIC RH(D): CPT

## 2022-10-11 PROCEDURE — 85025 COMPLETE CBC W/AUTO DIFF WBC: CPT

## 2022-10-11 PROCEDURE — 76801 OB US < 14 WKS SINGLE FETUS: CPT

## 2022-10-11 ASSESSMENT — LIFESTYLE VARIABLES
TOTAL SCORE: 0
HAVE YOU EVER FELT YOU SHOULD CUT DOWN ON YOUR DRINKING: NO
EVER FELT BAD OR GUILTY ABOUT YOUR DRINKING: NO
HAVE PEOPLE ANNOYED YOU BY CRITICIZING YOUR DRINKING: NO
AVERAGE NUMBER OF DAYS PER WEEK YOU HAVE A DRINK CONTAINING ALCOHOL: 0
HOW MANY TIMES IN THE PAST YEAR HAVE YOU HAD 5 OR MORE DRINKS IN A DAY: 0
CONSUMPTION TOTAL: NEGATIVE
DO YOU DRINK ALCOHOL: NO
ON A TYPICAL DAY WHEN YOU DRINK ALCOHOL HOW MANY DRINKS DO YOU HAVE: 0
TOTAL SCORE: 0
TOTAL SCORE: 0
EVER HAD A DRINK FIRST THING IN THE MORNING TO STEADY YOUR NERVES TO GET RID OF A HANGOVER: NO

## 2022-10-11 ASSESSMENT — FIBROSIS 4 INDEX: FIB4 SCORE: 0.4

## 2022-10-11 NOTE — ED TRIAGE NOTES
"Chief Complaint   Patient presents with    Pregnancy     9 weeks pregnant. Pt was pregnant with twins. Pt miscarried one of the twins three weeks ago.  Pt was admitted for this due to blood loss and pt was discharged on iron supplements. Pt does have a OB/GYN.     Vaginal Bleeding     Pt diagnosed with a subchorionic hemorrhage a week prior to the miscarriage and has been bleeding since that diagnosis.     Shortness of Breath     Pt states she feels winded when she gets up and walks.       /76   Pulse (!) 106   Temp 36.2 °C (97.2 °F) (Temporal)   Resp 16   Ht 1.676 m (5' 6\")   Wt 106 kg (232 lb 12.9 oz)   LMP 08/08/2022 (Exact Date)   SpO2 99%   BMI 37.58 kg/m²     Pt ambulatory from triage.  Pt states she has been passing large clots since she first started bleeding and has been compliant with her iron supplements. Pt came in today due to concern of bleeding and feeling short of breath when walking.     Pt is alert and oriented, speaking in full sentences, follows commands and responds appropriately to questions. Resp are even and unlabored.      Pt placed in lobby. Pt educated on triage process. Pt encouraged to alert staff for any changes.     Patient and staff wearing appropriate PPE    "

## 2022-10-12 LAB
HBV SURFACE AG SERPL QL IA: NEGATIVE
HIV 1+2 AB+HIV1 P24 AG SERPL QL IA: NEGATIVE
RUBV IGG SERPL IA-ACNC: NORMAL
TREPONEMA PALLIDUM IGG+IGM AB [PRESENCE] IN SERUM OR PLASMA BY IMMUNOASSAY: NORMAL

## 2022-10-12 NOTE — ED PROVIDER NOTES
ED Provider Note    CHIEF COMPLAINT  Chief Complaint   Patient presents with    Pregnancy     9 weeks pregnant. Pt was pregnant with twins. Pt miscarried one of the twins three weeks ago.  Pt was admitted for this due to blood loss and pt was discharged on iron supplements. Pt does have a OB/GYN.     Vaginal Bleeding     Pt diagnosed with a subchorionic hemorrhage a week prior to the miscarriage and has been bleeding since that diagnosis.     Shortness of Breath     Pt states she feels winded when she gets up and walks.         HPI  Martha Rubio is a 32 y.o. female here for evaluation of vaginal bleeding and fatigue.  Patient states that she is currently 9 weeks pregnant, and has been seen and followed by Dr. Ace.  Patient states that she was sent in here secondary to the continuous bleeding.  Bleeding is not changed over the last 4 weeks, she does have a noted subchorionic hemorrhage on her previous ultrasound, and did lose one of the twins that she was caring, approximate 3 weeks ago.  Patient states that she has no abdominal pain or cramping.  She has no chest pain.  He patient has no lightheadedness or dizziness.  The patient states that when she gets up to walk around, her heart rate goes up to the 120s, and then when she is resting it goes back down to normal.  She states that this has been an ongoing issue, and she is also discussed with Dr. Orellana as well.    ROS;  Please see HPI  O/W negative     PAST MEDICAL HISTORY  No bleeding disorders    SOCIAL HISTORY  Social History     Tobacco Use    Smoking status: Never    Smokeless tobacco: Never   Substance and Sexual Activity    Alcohol use: Not Currently    Drug use: Not Currently    Sexual activity: Not on file       SURGICAL HISTORY  patient denies any surgical history    CURRENT MEDICATIONS  Home Medications       Reviewed by Chiquita Heredia R.N. (Registered Nurse) on 10/11/22 at 1446  Med List Status: Not Addressed     Medication Last  "Dose Status   acetaminophen (TYLENOL) 500 MG Tab  Active   ferrous sulfate 325 (65 Fe) MG tablet  Active   Prenatal Vit-Fe Fumarate-FA (PRENATAL PO)  Active                    ALLERGIES  Allergies   Allergen Reactions    Codeine Unspecified     \"Chest pain\"    Penicillins Rash             REVIEW OF SYSTEMS  See HPI for further details. Review of systems as above, otherwise all other systems are negative.     PHYSICAL EXAM  VITAL SIGNS: /65   Pulse 99   Temp 36.2 °C (97.2 °F) (Temporal)   Resp 16   Ht 1.676 m (5' 6\")   Wt 106 kg (232 lb 12.9 oz)   LMP 08/08/2022 (Exact Date)   SpO2 100%   BMI 37.58 kg/m²     Constitutional: Well developed, well nourished. No acute distress.  HEENT: Normocephalic, atraumatic. MMM  Neck: Supple, Full range of motion   Chest/Pulmonary:  No respiratory distress.  Equal expansion   Musculoskeletal: No deformity, no edema, neurovascular intact.   Neuro: Clear speech, appropriate, cooperative, cranial nerves II-XII grossly intact.  Psych: Normal mood and affect      PROCEDURES     MEDICAL RECORD  I have reviewed patient's medical record and pertinent results are listed.    COURSE & MEDICAL DECISION MAKING  I have reviewed any medical record information, laboratory studies and radiographic results as noted above.    Patient has no chest pain, and no current shortness of breath.  She is resting comfortably.  Her EKG is normal.  Her heart rate has been in the 90s while here.  She has an appointment with Dr. Ace in the morning.    6:30 PM  I spoke to tuyet Holm for OB/GYN and Dr. Ace.  She states that patient can follow-up, and would like me to do a pelvic exam to evaluate the patient's bleeding.    7:07 PM  The pt has declined a pelvic exam at this time.  She has an appt with Dr. Ace tomorrow morning, and would like to have her do the exam.     I you have had any blood pressure issues while here in the emergency department, please see your doctor for a further " evaluation or work up.    Differential diagnoses include but not limited to: threatened miscarriage     This patient presents with threatened miscarriage .  At this time, I have counseled the patient/family regarding their medications, pain control, and follow up.  They will continue their medications, if any, as prescribed.  They will return immediately for any worsening symptoms and/or any other medical concerns.  They will see their doctor, or contact the doctor provided, in 1-2 days for follow up.       FINAL IMPRESSION  1. Threatened miscarriage                Electronically signed by: Jarred Booth D.O., 10/11/2022 7:04 PM

## 2022-10-12 NOTE — ED NOTES
Discharge teaching and paperwork provided regarding follow up appointment and all questions/concerns answered. VSS, bleeding assessment stable. Given information regarding home care and reasons to follow up with ED or primary MD. Patient discharged to the care of self and ambulated out of the ED.

## 2022-10-13 ENCOUNTER — HOSPITAL ENCOUNTER (OUTPATIENT)
Facility: MEDICAL CENTER | Age: 32
End: 2022-10-14
Attending: OBSTETRICS & GYNECOLOGY | Admitting: OBSTETRICS & GYNECOLOGY
Payer: MEDICAID

## 2022-10-13 PROBLEM — O20.9 FIRST TRIMESTER BLEEDING: Status: ACTIVE | Noted: 2022-10-13

## 2022-10-13 LAB
ABO GROUP BLD: NORMAL
BARCODED ABORH UBTYP: 6200
BARCODED PRD CODE UBPRD: NORMAL
BARCODED UNIT NUM UBUNT: NORMAL
BASOPHILS # BLD AUTO: 0.2 % (ref 0–1.8)
BASOPHILS # BLD AUTO: 0.3 % (ref 0–1.8)
BASOPHILS # BLD: 0.02 K/UL (ref 0–0.12)
BASOPHILS # BLD: 0.03 K/UL (ref 0–0.12)
BLD GP AB SCN SERPL QL: NORMAL
COMPONENT R 8504R: NORMAL
EOSINOPHIL # BLD AUTO: 0.06 K/UL (ref 0–0.51)
EOSINOPHIL # BLD AUTO: 0.07 K/UL (ref 0–0.51)
EOSINOPHIL NFR BLD: 0.7 % (ref 0–6.9)
EOSINOPHIL NFR BLD: 0.8 % (ref 0–6.9)
ERYTHROCYTE [DISTWIDTH] IN BLOOD BY AUTOMATED COUNT: 45.8 FL (ref 35.9–50)
ERYTHROCYTE [DISTWIDTH] IN BLOOD BY AUTOMATED COUNT: 48.2 FL (ref 35.9–50)
HCT VFR BLD AUTO: 20.8 % (ref 37–47)
HCT VFR BLD AUTO: 23 % (ref 37–47)
HGB BLD-MCNC: 6.6 G/DL (ref 12–16)
HGB BLD-MCNC: 7.4 G/DL (ref 12–16)
IMM GRANULOCYTES # BLD AUTO: 0.14 K/UL (ref 0–0.11)
IMM GRANULOCYTES # BLD AUTO: 0.18 K/UL (ref 0–0.11)
IMM GRANULOCYTES NFR BLD AUTO: 1.7 % (ref 0–0.9)
IMM GRANULOCYTES NFR BLD AUTO: 1.9 % (ref 0–0.9)
LYMPHOCYTES # BLD AUTO: 1.19 K/UL (ref 1–4.8)
LYMPHOCYTES # BLD AUTO: 1.67 K/UL (ref 1–4.8)
LYMPHOCYTES NFR BLD: 14.3 % (ref 22–41)
LYMPHOCYTES NFR BLD: 17.9 % (ref 22–41)
MCH RBC QN AUTO: 26 PG (ref 27–33)
MCH RBC QN AUTO: 26.9 PG (ref 27–33)
MCHC RBC AUTO-ENTMCNC: 31.7 G/DL (ref 33.6–35)
MCHC RBC AUTO-ENTMCNC: 32.2 G/DL (ref 33.6–35)
MCV RBC AUTO: 81.9 FL (ref 81.4–97.8)
MCV RBC AUTO: 83.6 FL (ref 81.4–97.8)
MONOCYTES # BLD AUTO: 0.58 K/UL (ref 0–0.85)
MONOCYTES # BLD AUTO: 0.64 K/UL (ref 0–0.85)
MONOCYTES NFR BLD AUTO: 6.9 % (ref 0–13.4)
MONOCYTES NFR BLD AUTO: 7 % (ref 0–13.4)
NEUTROPHILS # BLD AUTO: 6.34 K/UL (ref 2–7.15)
NEUTROPHILS # BLD AUTO: 6.74 K/UL (ref 2–7.15)
NEUTROPHILS NFR BLD: 72.2 % (ref 44–72)
NEUTROPHILS NFR BLD: 76.1 % (ref 44–72)
NRBC # BLD AUTO: 0.05 K/UL
NRBC # BLD AUTO: 0.1 K/UL
NRBC BLD-RTO: 0.6 /100 WBC
NRBC BLD-RTO: 1.1 /100 WBC
PLATELET # BLD AUTO: 294 K/UL (ref 164–446)
PLATELET # BLD AUTO: 328 K/UL (ref 164–446)
PMV BLD AUTO: 9.3 FL (ref 9–12.9)
PMV BLD AUTO: 9.8 FL (ref 9–12.9)
PRODUCT TYPE UPROD: NORMAL
RBC # BLD AUTO: 2.54 M/UL (ref 4.2–5.4)
RBC # BLD AUTO: 2.75 M/UL (ref 4.2–5.4)
RH BLD: NORMAL
UNIT STATUS USTAT: NORMAL
WBC # BLD AUTO: 8.3 K/UL (ref 4.8–10.8)
WBC # BLD AUTO: 9.3 K/UL (ref 4.8–10.8)

## 2022-10-13 PROCEDURE — 86900 BLOOD TYPING SEROLOGIC ABO: CPT

## 2022-10-13 PROCEDURE — 302449 STATCHG TRIAGE ONLY (STATISTIC)

## 2022-10-13 PROCEDURE — 85025 COMPLETE CBC W/AUTO DIFF WBC: CPT | Mod: 91

## 2022-10-13 PROCEDURE — 36430 TRANSFUSION BLD/BLD COMPNT: CPT

## 2022-10-13 PROCEDURE — 86850 RBC ANTIBODY SCREEN: CPT

## 2022-10-13 PROCEDURE — 96376 TX/PRO/DX INJ SAME DRUG ADON: CPT

## 2022-10-13 PROCEDURE — 700111 HCHG RX REV CODE 636 W/ 250 OVERRIDE (IP)

## 2022-10-13 PROCEDURE — 86923 COMPATIBILITY TEST ELECTRIC: CPT | Mod: 91

## 2022-10-13 PROCEDURE — 700102 HCHG RX REV CODE 250 W/ 637 OVERRIDE(OP): Performed by: OBSTETRICS & GYNECOLOGY

## 2022-10-13 PROCEDURE — 36415 COLL VENOUS BLD VENIPUNCTURE: CPT

## 2022-10-13 PROCEDURE — 96374 THER/PROPH/DIAG INJ IV PUSH: CPT

## 2022-10-13 PROCEDURE — A9270 NON-COVERED ITEM OR SERVICE: HCPCS | Performed by: OBSTETRICS & GYNECOLOGY

## 2022-10-13 PROCEDURE — 96375 TX/PRO/DX INJ NEW DRUG ADDON: CPT

## 2022-10-13 PROCEDURE — P9016 RBC LEUKOCYTES REDUCED: HCPCS | Mod: 91

## 2022-10-13 PROCEDURE — 700111 HCHG RX REV CODE 636 W/ 250 OVERRIDE (IP): Performed by: OBSTETRICS & GYNECOLOGY

## 2022-10-13 PROCEDURE — G0378 HOSPITAL OBSERVATION PER HR: HCPCS

## 2022-10-13 PROCEDURE — 86901 BLOOD TYPING SEROLOGIC RH(D): CPT

## 2022-10-13 RX ORDER — DIPHENHYDRAMINE HYDROCHLORIDE 50 MG/ML
25 INJECTION INTRAMUSCULAR; INTRAVENOUS ONCE
Status: COMPLETED | OUTPATIENT
Start: 2022-10-13 | End: 2022-10-13

## 2022-10-13 RX ORDER — ONDANSETRON 2 MG/ML
4 INJECTION INTRAMUSCULAR; INTRAVENOUS EVERY 4 HOURS PRN
Status: DISCONTINUED | OUTPATIENT
Start: 2022-10-13 | End: 2022-10-14 | Stop reason: HOSPADM

## 2022-10-13 RX ORDER — ACETAMINOPHEN 500 MG
1000 TABLET ORAL ONCE
Status: COMPLETED | OUTPATIENT
Start: 2022-10-13 | End: 2022-10-13

## 2022-10-13 RX ORDER — DIPHENHYDRAMINE HYDROCHLORIDE 50 MG/ML
INJECTION INTRAMUSCULAR; INTRAVENOUS
Status: COMPLETED
Start: 2022-10-13 | End: 2022-10-13

## 2022-10-13 RX ADMIN — ONDANSETRON 4 MG: 2 INJECTION INTRAMUSCULAR; INTRAVENOUS at 20:59

## 2022-10-13 RX ADMIN — DIPHENHYDRAMINE HYDROCHLORIDE 25 MG: 50 INJECTION, SOLUTION INTRAMUSCULAR; INTRAVENOUS at 23:05

## 2022-10-13 RX ADMIN — ACETAMINOPHEN 1000 MG: 500 TABLET ORAL at 12:52

## 2022-10-13 RX ADMIN — DIPHENHYDRAMINE HYDROCHLORIDE 25 MG: 50 INJECTION, SOLUTION INTRAMUSCULAR; INTRAVENOUS at 12:52

## 2022-10-13 ASSESSMENT — FIBROSIS 4 INDEX: FIB4 SCORE: .3535533905932737622

## 2022-10-13 ASSESSMENT — PAIN SCALES - GENERAL: PAINLEVEL: 0 - NO PAIN

## 2022-10-14 VITALS
WEIGHT: 232 LBS | OXYGEN SATURATION: 94 % | HEART RATE: 91 BPM | HEIGHT: 66 IN | TEMPERATURE: 98 F | DIASTOLIC BLOOD PRESSURE: 56 MMHG | SYSTOLIC BLOOD PRESSURE: 118 MMHG | RESPIRATION RATE: 17 BRPM | BODY MASS INDEX: 37.28 KG/M2

## 2022-10-14 LAB
BASOPHILS # BLD AUTO: 0.5 % (ref 0–1.8)
BASOPHILS # BLD: 0.04 K/UL (ref 0–0.12)
EOSINOPHIL # BLD AUTO: 0.11 K/UL (ref 0–0.51)
EOSINOPHIL NFR BLD: 1.2 % (ref 0–6.9)
ERYTHROCYTE [DISTWIDTH] IN BLOOD BY AUTOMATED COUNT: 46.7 FL (ref 35.9–50)
HCT VFR BLD AUTO: 25.7 % (ref 37–47)
HGB BLD-MCNC: 8.5 G/DL (ref 12–16)
IMM GRANULOCYTES # BLD AUTO: 0.2 K/UL (ref 0–0.11)
IMM GRANULOCYTES NFR BLD AUTO: 2.3 % (ref 0–0.9)
LYMPHOCYTES # BLD AUTO: 1.58 K/UL (ref 1–4.8)
LYMPHOCYTES NFR BLD: 17.9 % (ref 22–41)
MCH RBC QN AUTO: 27.9 PG (ref 27–33)
MCHC RBC AUTO-ENTMCNC: 33.1 G/DL (ref 33.6–35)
MCV RBC AUTO: 84.3 FL (ref 81.4–97.8)
MONOCYTES # BLD AUTO: 0.67 K/UL (ref 0–0.85)
MONOCYTES NFR BLD AUTO: 7.6 % (ref 0–13.4)
NEUTROPHILS # BLD AUTO: 6.25 K/UL (ref 2–7.15)
NEUTROPHILS NFR BLD: 70.5 % (ref 44–72)
NRBC # BLD AUTO: 0.11 K/UL
NRBC BLD-RTO: 1.2 /100 WBC
PLATELET # BLD AUTO: 264 K/UL (ref 164–446)
PMV BLD AUTO: 9.4 FL (ref 9–12.9)
RBC # BLD AUTO: 3.05 M/UL (ref 4.2–5.4)
WBC # BLD AUTO: 8.9 K/UL (ref 4.8–10.8)

## 2022-10-14 PROCEDURE — G0378 HOSPITAL OBSERVATION PER HR: HCPCS

## 2022-10-14 PROCEDURE — 36415 COLL VENOUS BLD VENIPUNCTURE: CPT

## 2022-10-14 PROCEDURE — 85025 COMPLETE CBC W/AUTO DIFF WBC: CPT

## 2022-10-14 NOTE — PROGRESS NOTES
1900- Report received, assumed pt care. Plan to have CBC redrawn approx 2000, call Dr. Paula with results.     2115- Dr. Paula notified of lab results. New order to transfuse 1 unit PRBCs received.     2300- Pt. Reports feeling anxious. Medicated with benadryl.     2330- pt. Sleeping, arouses easily. States anxious feeling has resolved, denies needs. Transfusion continues.     0120- Transfusion complete. IV saline locked, monitors off and pt. Positioned for comfort. Encouraged to sleep. Plan to redraw CBC in 4 hours.     0340- Pt sleeping, undisturbed at this time.     0510- Pt sleeping, arouses easily. Labs drawn and sent to lab.     0535- Dr. Paula notified of lab results    0700- Report given to oncoming shift.

## 2022-10-14 NOTE — PROGRESS NOTES
0700    Report received from Merly RN    0725 Dr. Paula to bedside, per MD discharge patient     0805 Discharge instructions complete, pelvic rest precautions, patient to follow-up on Monday in Dr. Elias office

## 2022-10-14 NOTE — PROGRESS NOTES
Labor Progress Note    Martha Rubio   9 3/7 weeks      Subjective:  S/p 2 U prbc. Still having some bleeding - in toilet so harder to quantify. Denies any pain    Objective:   Vitals:    10/13/22 1930 10/13/22 1935 10/13/22 2005 10/13/22 2015   BP:   106/58    Pulse: 99 99 (!) 103 (!) 104   Resp:   17    Temp:   36.5 °C (97.7 °F)    TempSrc:   Temporal    SpO2: 97% 96% 95% 96%   Weight:       Height:          Gen: no distress    Labs:  Recent Results (from the past 24 hour(s))   CBC WITH DIFFERENTIAL    Collection Time: 10/13/22 10:13 AM   Result Value Ref Range    WBC 8.3 4.8 - 10.8 K/uL    RBC 2.54 (L) 4.20 - 5.40 M/uL    Hemoglobin 6.6 (L) 12.0 - 16.0 g/dL    Hematocrit 20.8 (L) 37.0 - 47.0 %    MCV 81.9 81.4 - 97.8 fL    MCH 26.0 (L) 27.0 - 33.0 pg    MCHC 31.7 (L) 33.6 - 35.0 g/dL    RDW 45.8 35.9 - 50.0 fL    Platelet Count 328 164 - 446 K/uL    MPV 9.8 9.0 - 12.9 fL    Neutrophils-Polys 76.10 (H) 44.00 - 72.00 %    Lymphocytes 14.30 (L) 22.00 - 41.00 %    Monocytes 7.00 0.00 - 13.40 %    Eosinophils 0.70 0.00 - 6.90 %    Basophils 0.20 0.00 - 1.80 %    Immature Granulocytes 1.70 (H) 0.00 - 0.90 %    Nucleated RBC 0.60 /100 WBC    Neutrophils (Absolute) 6.34 2.00 - 7.15 K/uL    Lymphs (Absolute) 1.19 1.00 - 4.80 K/uL    Monos (Absolute) 0.58 0.00 - 0.85 K/uL    Eos (Absolute) 0.06 0.00 - 0.51 K/uL    Baso (Absolute) 0.02 0.00 - 0.12 K/uL    Immature Granulocytes (abs) 0.14 (H) 0.00 - 0.11 K/uL    NRBC (Absolute) 0.05 K/uL   COD - Adult (Type and Screen)    Collection Time: 10/13/22 10:13 AM   Result Value Ref Range    ABO Grouping Only A     Rh Grouping Only POS     Antibody Screen-Cod NEG     Component R       R99                 Red Cells, LR       [id number]   transfused   10/13/22   12:36      Product Type R99     Dispense Status transfused     Unit Number (Barcoded) [id number]     Product Code (Barcoded) B5413O45     Blood Type (Barcoded) 6200     Component R       R99                  Red Cells, LR       [id number]   transfused   10/13/22   14:01      Product Type R99     Dispense Status transfused     Unit Number (Barcoded) [id number]     Product Code (Barcoded) M0446K97     Blood Type (Barcoded) 6200    CBC WITH DIFFERENTIAL    Collection Time: 10/13/22  8:34 PM   Result Value Ref Range    WBC 9.3 4.8 - 10.8 K/uL    RBC 2.75 (L) 4.20 - 5.40 M/uL    Hemoglobin 7.4 (L) 12.0 - 16.0 g/dL    Hematocrit 23.0 (L) 37.0 - 47.0 %    MCV 83.6 81.4 - 97.8 fL    MCH 26.9 (L) 27.0 - 33.0 pg    MCHC 32.2 (L) 33.6 - 35.0 g/dL    RDW 48.2 35.9 - 50.0 fL    Platelet Count 294 164 - 446 K/uL    MPV 9.3 9.0 - 12.9 fL    Neutrophils-Polys 72.20 (H) 44.00 - 72.00 %    Lymphocytes 17.90 (L) 22.00 - 41.00 %    Monocytes 6.90 0.00 - 13.40 %    Eosinophils 0.80 0.00 - 6.90 %    Basophils 0.30 0.00 - 1.80 %    Immature Granulocytes 1.90 (H) 0.00 - 0.90 %    Nucleated RBC 1.10 /100 WBC    Neutrophils (Absolute) 6.74 2.00 - 7.15 K/uL    Lymphs (Absolute) 1.67 1.00 - 4.80 K/uL    Monos (Absolute) 0.64 0.00 - 0.85 K/uL    Eos (Absolute) 0.07 0.00 - 0.51 K/uL    Baso (Absolute) 0.03 0.00 - 0.12 K/uL    Immature Granulocytes (abs) 0.18 (H) 0.00 - 0.11 K/uL    NRBC (Absolute) 0.10 K/uL       Assessment: plan  IUP at 9 3/7 weeks  Subchorionic hemorrhage with active bleeding but still live IUP. S/p 2 U prbc and h/h still low. Will transfuse her at least one more unit now. And then recheck her.   Will put a hat in the toilet to be able to quantify how much blood loss there is.       Clarissa Paula M.D.

## 2022-10-14 NOTE — DISCHARGE SUMMARY
DATE OF ADMISSION:  10/13/2022   DATE OF DISCHARGE:  10/14/2022     PRINCIPAL DIAGNOSES:  1.  Intrauterine pregnancy at 9 and 3/7th weeks' gestation.  2.  Subchorionic hemorrhage with significant continuous bleeding.  3.  Acute blood loss anemia.     PRINCIPAL PROCEDURES:  1.  Observation.  2.  Three units packed red blood cells.     HOSPITAL COURSE:  The patient is a private patient of Dr. Afsaneh Ledbetter.    She is a 32-year-old  6, para 2, who presented at 9 and 3/7th weeks'   gestation with known subchorionic hemorrhage.  She has been having   intermittent heavy bleeding on and off for the last several weeks.  She has   been to the ER several times and had other complaints now of shortness of   breath and not feeling well.  She was found to have a hemoglobin of 7, was   still continued bleeding and was sent to labor and delivery for a blood   transfusion.  Ultrasound has been checked and the baby is still alive.  There   is fetal heart tones present by ultrasound. But on arrival, her hemoglobin had   further dropped down to 6.6 and hematocrit of 20.8.  She received a total of   3 units packed red blood cells, 4 hours past in the last transfusion it was   rechecked and she was 8.5 and 25.7.  She feels much better and has color in   her face.  She will be discharged home to continue pelvic rest.  She will   continue her iron supplements and prenatal vitamins.  I have her get a hat to   put into the toilet to kind of monitor how much bleeding she is having and to   return back to labor and delivery or the ER should she be filling up the   basin.  Precautions given.  She will follow up on Monday with Dr. Jauregui in   the office for another blood count and recheck.        ______________________________  MD VERENICE HARPER/WOODY    DD:  10/14/2022 08:32  DT:  10/14/2022 09:14    Job#:  [id number]

## 2022-10-14 NOTE — H&P
DATE OF ADMISSION:  10/13/2022     HISTORY OF PRESENT ILLNESS:  The patient is a private patient of Dr. Afsaneh Ledbetter, who is a 32-year-old  6, para 2 who is 9 3/7 weeks pregnant with live IUP, who presents to labor and   delivery for a scheduled admission for blood transfusion secondary to ongoing   subchorionic hemorrhage causing severe anemia and the patient is now   symptomatic.  She was in the office yesterday, but in the ER the day prior,   hemoglobin was not drawn, but one was done yesterday in the office and she is   severely anemic.  She has large gushes of bleeding, but they are intermittent.    Upon arrival, she had denied any bleeding at the current moment, but states   that she did have some spotting earlier.  Later while here admitted, she had a   large gush while on the toilet, bright red.  No significant clots.  She   denies any pain.  She was having some nausea, did take some oral Zofran   earlier today.  She is obviously worried and concerned and says she just feels   not good and dizzy and lightheaded.     PAST MEDICAL HISTORY:  Significant for asthma and anemia.     PAST SURGICAL HISTORY:  None.     ALLERGIES:  PENICILLIN AND CODEINE.     CURRENT MEDICATIONS:  Prenatal vitamins and occasional Zofran.     GYNECOLOGIC HISTORY:  No history of abnormal Paps or STDs including denies   history of herpes simplex virus.     OBSTETRIC HISTORY:  She is a  6, para 2.  She has had 2 spontaneous   vaginal deliveries, the last was in .  Her last pregnancy was a molar   pregnancy, she reports.     SOCIAL HISTORY:  No history of alcohol, tobacco or drug use.     She does accept the use of blood products in the event of hemorrhage.  She is   not COVID vaccinated.     PHYSICAL EXAMINATION:  VITAL SIGNS:  Upon arrival, blood pressure 117/68, she is afebrile, pulse   , weight 105 kilograms.  GENERAL:  She is awake, alert, oriented, in no acute distress, but is   concerned about her  bleeding.  ABDOMEN:  Soft, nontender, nondistended, normal bowel sounds.  EXTREMITIES:  No cyanosis, clubbing or edema.  ABDOMEN:  Abdominal ultrasound at the bedside reveals intrauterine pregnancy   with fetal cardiac activity.  No other measurements were taken.  PELVIC:  Sterile speculum exam was performed.  She had one approximately 3 cm   size clot that came out, but otherwise, the os is closed and there does not   appear to be any active bleeding at this time.     LABORATORY DATA:  Her hemoglobin on arrival was 6.6, hematocrit 20.8 and   platelets of 328,000.  She is A positive.     ASSESSMENT AND PLAN:  Live IUP at 9 6/7 weeks with Chronic subchorionic hemorrhage, which is continued and ongoing.  She   had a transvaginal ultrasound done yesterday and the day prior and there was   no evidence of any clot or debris in the cervical canal or vaginal vault.  The   patient was reassured with a brief transabdominal ultrasound showing a   heartbeat and the baby moving.  Again, since her hemoglobin returned at 6.6,   we will plan to transfuse her at least 2 units of packed red blood cells.    Risks, benefits and alternatives have been thoroughly discussed with her   regarding receiving a blood transfusion.  Risks were discussed and the patient   had ample time to ask any questions before proceeding.  Cervix is closed;   however, her condition is still guarded.        ______________________________  MD VERENICE HARPER/TAMARA    DD:  10/13/2022 16:43  DT:  10/13/2022 19:24    Job#:  [id number]

## 2022-10-14 NOTE — DISCHARGE INSTRUCTIONS
Activity Restriction During Pregnancy  Your health care provider may recommend specific activity restrictions during pregnancy for a variety of reasons. Activity restriction may require that you limit activities that require great effort, such as exercise, lifting, or sex.  The type of activity restriction will vary for each person, depending on your risk or the problems you are having. Activity restriction may be recommended for a period of time until your baby is delivered.  Why are activity restrictions recommended?  Activity restriction may be recommended if:  Your placenta is partially or completely covering the opening of your cervix (placenta previa).  There is bleeding between the wall of the uterus and the amniotic sac in the first trimester of pregnancy (subchorionic hemorrhage).  You went into labor too early ( labor).  You have a history of miscarriage.  You have a condition that causes high blood pressure during pregnancy (preeclampsia or eclampsia).  You are pregnant with more than one baby.  Your baby is not growing well.  What are the risks?  The risks depend on your specific restriction. Strict bed rest has the most physical and emotional risks and is no longer routinely recommended. Risks of strict bed rest include:  Loss of muscle conditioning from not moving.  Blood clots.  Social isolation.  Depression.  Loss of income.  Talk with your health care team about activity restriction to decide if it is best for you and your baby. Even if you are having problems during your pregnancy, you may be able to continue with normal levels of activity with careful monitoring by your health care team.  Follow these instructions at home:  If needed, based on your overall health and the health of your baby, your health care provider will decide which type of activity restriction is right for you. Activity restrictions may include:  Not lifting anything heavier than 10 pounds (4.5 kg).  Avoiding activities  that take a lot of physical effort.  No lifting or straining.  Resting in a sitting position or lying down for periods of time during the day.  Pelvic rest may be recommended along with activity restrictions. If pelvic rest is recommended, then:  Do not have sex, an orgasm, or use sexual stimulation.  Do not use tampons. Do not douche. Do not put anything into your vagina.  Do not lift anything that is heavier than 10 lb (4.5 kg).  Avoid activities that require a lot of effort.  Avoid any activity in which your pelvic muscles could become strained, such as squatting.  Questions to ask your health care provider  Why is my activity being limited?  How will activity restrictions affect my body?  Why is rest helpful for me and my baby?  What activities can I do?  When can I return to normal activities?  When should I seek immediate medical care?  Seek immediate medical care if you have:  Vaginal bleeding.  Vaginal discharge.  Cramping pain in your lower abdomen.  Regular contractions.  A low, dull backache.  Summary  Your health care provider may recommend specific activity restrictions during pregnancy for a variety of reasons.  Activity restriction may require that you limit activities such as exercise, lifting, sex, or any other activity that requires great effort.  Discuss the risks and benefits of activity restriction with your health care team to decide if it is best for you and your baby.  Contact your health care provider right away if you think you are having contractions, or if you notice vaginal bleeding, discharge, or cramping.  This information is not intended to replace advice given to you by your health care provider. Make sure you discuss any questions you have with your health care provider.  Document Released: 04/13/2012 Document Revised: 04/09/2019 Document Reviewed: 04/09/2019  Elsevier Patient Education © 2020 [employer]

## 2022-10-17 ENCOUNTER — APPOINTMENT (OUTPATIENT)
Dept: RADIOLOGY | Facility: MEDICAL CENTER | Age: 32
End: 2022-10-17
Attending: OBSTETRICS & GYNECOLOGY
Payer: COMMERCIAL

## 2022-10-17 ENCOUNTER — HOSPITAL ENCOUNTER (EMERGENCY)
Facility: MEDICAL CENTER | Age: 32
End: 2022-10-17
Attending: OBSTETRICS & GYNECOLOGY | Admitting: OBSTETRICS & GYNECOLOGY
Payer: COMMERCIAL

## 2022-10-17 VITALS
RESPIRATION RATE: 16 BRPM | BODY MASS INDEX: 37.28 KG/M2 | HEIGHT: 66 IN | SYSTOLIC BLOOD PRESSURE: 94 MMHG | DIASTOLIC BLOOD PRESSURE: 58 MMHG | OXYGEN SATURATION: 95 % | TEMPERATURE: 97.8 F | WEIGHT: 232 LBS | HEART RATE: 84 BPM

## 2022-10-17 LAB
BASOPHILS # BLD AUTO: 0.4 % (ref 0–1.8)
BASOPHILS # BLD: 0.04 K/UL (ref 0–0.12)
EOSINOPHIL # BLD AUTO: 0.08 K/UL (ref 0–0.51)
EOSINOPHIL NFR BLD: 0.8 % (ref 0–6.9)
ERYTHROCYTE [DISTWIDTH] IN BLOOD BY AUTOMATED COUNT: 51.8 FL (ref 35.9–50)
HCT VFR BLD AUTO: 30 % (ref 37–47)
HGB BLD-MCNC: 9.5 G/DL (ref 12–16)
IMM GRANULOCYTES # BLD AUTO: 0.21 K/UL (ref 0–0.11)
IMM GRANULOCYTES NFR BLD AUTO: 2.1 % (ref 0–0.9)
LYMPHOCYTES # BLD AUTO: 1.38 K/UL (ref 1–4.8)
LYMPHOCYTES NFR BLD: 13.8 % (ref 22–41)
MCH RBC QN AUTO: 27.4 PG (ref 27–33)
MCHC RBC AUTO-ENTMCNC: 31.7 G/DL (ref 33.6–35)
MCV RBC AUTO: 86.5 FL (ref 81.4–97.8)
MONOCYTES # BLD AUTO: 0.57 K/UL (ref 0–0.85)
MONOCYTES NFR BLD AUTO: 5.7 % (ref 0–13.4)
NEUTROPHILS # BLD AUTO: 7.72 K/UL (ref 2–7.15)
NEUTROPHILS NFR BLD: 77.2 % (ref 44–72)
NRBC # BLD AUTO: 0.02 K/UL
NRBC BLD-RTO: 0.2 /100 WBC
PLATELET # BLD AUTO: 375 K/UL (ref 164–446)
PMV BLD AUTO: 9.5 FL (ref 9–12.9)
RBC # BLD AUTO: 3.47 M/UL (ref 4.2–5.4)
WBC # BLD AUTO: 10 K/UL (ref 4.8–10.8)

## 2022-10-17 PROCEDURE — 700105 HCHG RX REV CODE 258: Performed by: OBSTETRICS & GYNECOLOGY

## 2022-10-17 PROCEDURE — 36415 COLL VENOUS BLD VENIPUNCTURE: CPT

## 2022-10-17 PROCEDURE — 99284 EMERGENCY DEPT VISIT MOD MDM: CPT

## 2022-10-17 PROCEDURE — 76801 OB US < 14 WKS SINGLE FETUS: CPT

## 2022-10-17 PROCEDURE — 85025 COMPLETE CBC W/AUTO DIFF WBC: CPT

## 2022-10-17 RX ORDER — SODIUM CHLORIDE, SODIUM LACTATE, POTASSIUM CHLORIDE, CALCIUM CHLORIDE 600; 310; 30; 20 MG/100ML; MG/100ML; MG/100ML; MG/100ML
INJECTION, SOLUTION INTRAVENOUS CONTINUOUS
Status: DISCONTINUED | OUTPATIENT
Start: 2022-10-17 | End: 2022-10-17 | Stop reason: HOSPADM

## 2022-10-17 RX ADMIN — SODIUM CHLORIDE, POTASSIUM CHLORIDE, SODIUM LACTATE AND CALCIUM CHLORIDE: 600; 310; 30; 20 INJECTION, SOLUTION INTRAVENOUS at 12:30

## 2022-10-17 ASSESSMENT — FIBROSIS 4 INDEX: FIB4 SCORE: 0.5

## 2022-10-17 NOTE — PROGRESS NOTES
32 y.o.  EDC 5/15/23 (10.0 wks)     Pt presents to L&D c/o vaginal bleeding. She has been diagnosed with a subchorionic hemorrhage with ongoing bleeding and had a blood transfusion on 10/13. She was supposed to have a follow-up CBC today, however she states that she had significant bleeding in the bathtub last night and was advised to come to L&D. Pt states she had to drain the bathtub and fill it with new water because she was bleeding so much. Pt reports spotting today and denies any cramping. States she gets slightly light-headed when standing up, but is otherwise feeling okay. Dr Alexander notified. CBC, trans-vaginal US and IV fluids ordered.    1415-Dr Alexander at bedside. Dr Mcdaniels previously at bedside to see pt. D/c order received. D/c instructions reviewed with pt.

## 2022-10-18 NOTE — ED PROVIDER NOTES
DATE OF SERVICE:  10/17/2022     OBSTETRIC EMERGENCY ROOM CONSULTATION     DENTIFICATION:  This is a 32-year-old  6, para 2-0-3-2 whose EDC is   05/15/2023, which makes her 10 weeks' gestation today.  She is here for   evaluation of ongoing vaginal bleeding.     HISTORY OF PRESENT ILLNESS:  This is a 32-year-old -0-3-2, who had   original episode of bleeding back on ; at that time, she presented to the   emergency room with vaginal bleeding and was found to have suspected ongoing   inevitable spontaneous miscarriage.  Her ultrasound had initially showed a   suspected twin pregnancy with miscarriage in process of twin A.  She was   admitted and observed, had ongoing bleeding and a repeat ultrasound the next   day showed one remaining intrauterine gestation, measuring 6 weeks 5 days with   fetal heart tones.  Her bleeding was then minimal and she desired discharge   with expectant management.  She followed up with Dr. Jauregui and then   subsequently returned to the hospital on 10/13 and was readmitted to the   hospital with bleeding at 9 weeks. She was diagnosed with anemia.  Her   hemoglobin had dropped down to 6.6 and she was symptomatic.  She received 3   units of packed red blood cells and her hemoglobin improved to 8.5.  She was   discharged to home with continued pelvic rest.  She reports she has been   bleeding off and on for the last several days.  Last night, it intensified and   bleeding was heavier.  She called the office and was advised to come into the   hospital again. On evaluation, there is bleeding on her pad.  She shows us   pictures of blood clots that have passed.  She denies any dizziness today.    She denies any lightheadedness.  She has been feeling okay.  She has basically   been working from home.  She has not been doing anything strenuous.     PAST MEDICAL HISTORY:  She denies.     PAST SURGICAL HISTORY:  D and C.     OBSTETRICAL HISTORY:    1.  Term vaginal delivery.  2.   Miscarriage at 8 weeks.  3.  Vaginal delivery at term.  4.  Elective termination of pregnancy.  5.  Molar pregnancy requiring D and C.  6.  This is her sixth and current pregnancy.     GYNECOLOGIC HISTORY:  She denies a history of abnormal Pap smears or sexually   transmitted infections.  She is not sure when her last Pap smear was taken.     SOCIAL HISTORY:  She is involved with the father of the baby.  She denies use   of tobacco, alcohol or drugs.  She sells life insurance. She has been able to   work from home.  She was vaping prior to pregnancy.     PHYSICAL EXAMINATION:  VITAL SIGNS:  Today, her blood pressure 99/60 and 94/58, pulse 90s, she is   afebrile.  GENERAL:  She is pleasant, appears her stated age.  ABDOMEN:  Soft, nondistended, nontender.  PELVIC:  There is bleeding on her Carla-Pad.  Bedside ultrasound shows a   single, living, intrauterine pregnancy with a normal-appearing gestational sac   and crown rump length of 3.88 cm consistent with 10 weeks 6 days and cardiac   motion with fetal heart rate of 157 beats per minute.  There is irregular soft   tissue mass involving the lower uterine segment into the cervix which is   likely a blood clot.  There is no adnexal mass seen. There is no free fluid in   the pelvis.     LABORATORY DATA:  CBC was collected and hemoglobin is 9.5, hematocrit 30,   platelet count 375.     ASSESSMENT AND PLAN:  A 32-year-old  at 10 weeks gestation today here   with ongoing vaginal bleeding, pregnancy is currently viable and her   hemoglobin is currently stable.  She has been counseled regarding her options   including ongoing expectant management versus termination of pregnancy.  We   asked Dr. Mcdaniels of [facility] to see the patient as well.  He   has done that and has made his own recommendations and observation, the   patient wants to continue for now as she is feeling okay.  In the short term,   we will plan to recheck her hemoglobin on Thursday of  this week and then   follow up for another ultrasound next week.  She was given strict precautions   to return for heavy bleeding, feeling lightheaded or dizzy or if she has any   other symptoms of concern.  The patient voiced her understanding and agrees to   proceed.  She will be discharged to home.        ______________________________  MD JAMIR Lovett/RENZO/ADRI    DD:  10/17/2022 14:38  DT:  10/17/2022 18:02    Job#:  [id number]

## 2022-11-11 ENCOUNTER — HOSPITAL ENCOUNTER (EMERGENCY)
Facility: MEDICAL CENTER | Age: 32
End: 2022-11-11
Attending: OBSTETRICS & GYNECOLOGY | Admitting: OBSTETRICS & GYNECOLOGY
Payer: MEDICAID

## 2022-11-11 VITALS
OXYGEN SATURATION: 99 % | TEMPERATURE: 98.5 F | WEIGHT: 227 LBS | HEIGHT: 66 IN | SYSTOLIC BLOOD PRESSURE: 105 MMHG | RESPIRATION RATE: 16 BRPM | HEART RATE: 90 BPM | DIASTOLIC BLOOD PRESSURE: 51 MMHG | BODY MASS INDEX: 36.48 KG/M2

## 2022-11-11 LAB
ABO GROUP BLD: NORMAL
APTT PPP: 23.8 SEC (ref 24.7–36)
BARCODED ABORH UBTYP: 6200
BARCODED ABORH UBTYP: 6200
BARCODED PRD CODE UBPRD: NORMAL
BARCODED PRD CODE UBPRD: NORMAL
BARCODED UNIT NUM UBUNT: NORMAL
BARCODED UNIT NUM UBUNT: NORMAL
BASOPHILS # BLD AUTO: 0.4 % (ref 0–1.8)
BASOPHILS # BLD: 0.04 K/UL (ref 0–0.12)
BLD GP AB SCN SERPL QL: NORMAL
COMPONENT R 8504R: NORMAL
COMPONENT R 8504R: NORMAL
EOSINOPHIL # BLD AUTO: 0.04 K/UL (ref 0–0.51)
EOSINOPHIL NFR BLD: 0.4 % (ref 0–6.9)
ERYTHROCYTE [DISTWIDTH] IN BLOOD BY AUTOMATED COUNT: 40.2 FL (ref 35.9–50)
FIBRINOGEN PPP-MCNC: 388 MG/DL (ref 215–460)
HCT VFR BLD AUTO: 25.1 % (ref 37–47)
HGB BLD-MCNC: 7.8 G/DL (ref 12–16)
IMM GRANULOCYTES # BLD AUTO: 0.12 K/UL (ref 0–0.11)
IMM GRANULOCYTES NFR BLD AUTO: 1.2 % (ref 0–0.9)
INR PPP: 1.13 (ref 0.87–1.13)
LYMPHOCYTES # BLD AUTO: 1.51 K/UL (ref 1–4.8)
LYMPHOCYTES NFR BLD: 15.4 % (ref 22–41)
MCH RBC QN AUTO: 25.2 PG (ref 27–33)
MCHC RBC AUTO-ENTMCNC: 31.1 G/DL (ref 33.6–35)
MCV RBC AUTO: 81.2 FL (ref 81.4–97.8)
MONOCYTES # BLD AUTO: 0.73 K/UL (ref 0–0.85)
MONOCYTES NFR BLD AUTO: 7.4 % (ref 0–13.4)
NEUTROPHILS # BLD AUTO: 7.38 K/UL (ref 2–7.15)
NEUTROPHILS NFR BLD: 75.2 % (ref 44–72)
NRBC # BLD AUTO: 0.03 K/UL
NRBC BLD-RTO: 0.3 /100 WBC
PLATELET # BLD AUTO: 400 K/UL (ref 164–446)
PMV BLD AUTO: 9.3 FL (ref 9–12.9)
PRODUCT TYPE UPROD: NORMAL
PRODUCT TYPE UPROD: NORMAL
PROTHROMBIN TIME: 14.4 SEC (ref 12–14.6)
RBC # BLD AUTO: 3.09 M/UL (ref 4.2–5.4)
RH BLD: NORMAL
UNIT STATUS USTAT: NORMAL
UNIT STATUS USTAT: NORMAL
WBC # BLD AUTO: 9.8 K/UL (ref 4.8–10.8)

## 2022-11-11 PROCEDURE — 86900 BLOOD TYPING SEROLOGIC ABO: CPT

## 2022-11-11 PROCEDURE — P9016 RBC LEUKOCYTES REDUCED: HCPCS | Mod: 91

## 2022-11-11 PROCEDURE — 96374 THER/PROPH/DIAG INJ IV PUSH: CPT

## 2022-11-11 PROCEDURE — 85610 PROTHROMBIN TIME: CPT

## 2022-11-11 PROCEDURE — 86850 RBC ANTIBODY SCREEN: CPT

## 2022-11-11 PROCEDURE — 85025 COMPLETE CBC W/AUTO DIFF WBC: CPT

## 2022-11-11 PROCEDURE — A9270 NON-COVERED ITEM OR SERVICE: HCPCS | Performed by: OBSTETRICS & GYNECOLOGY

## 2022-11-11 PROCEDURE — 86923 COMPATIBILITY TEST ELECTRIC: CPT

## 2022-11-11 PROCEDURE — 99284 EMERGENCY DEPT VISIT MOD MDM: CPT

## 2022-11-11 PROCEDURE — 86901 BLOOD TYPING SEROLOGIC RH(D): CPT

## 2022-11-11 PROCEDURE — 36415 COLL VENOUS BLD VENIPUNCTURE: CPT

## 2022-11-11 PROCEDURE — 36430 TRANSFUSION BLD/BLD COMPNT: CPT

## 2022-11-11 PROCEDURE — 700111 HCHG RX REV CODE 636 W/ 250 OVERRIDE (IP): Performed by: OBSTETRICS & GYNECOLOGY

## 2022-11-11 PROCEDURE — 85730 THROMBOPLASTIN TIME PARTIAL: CPT

## 2022-11-11 PROCEDURE — 85384 FIBRINOGEN ACTIVITY: CPT

## 2022-11-11 PROCEDURE — 700102 HCHG RX REV CODE 250 W/ 637 OVERRIDE(OP): Performed by: OBSTETRICS & GYNECOLOGY

## 2022-11-11 RX ORDER — ACETAMINOPHEN 325 MG/1
650 TABLET ORAL EVERY 4 HOURS PRN
Status: DISCONTINUED | OUTPATIENT
Start: 2022-11-11 | End: 2022-11-11 | Stop reason: HOSPADM

## 2022-11-11 RX ORDER — ONDANSETRON 4 MG/1
4 TABLET, ORALLY DISINTEGRATING ORAL ONCE
Status: COMPLETED | OUTPATIENT
Start: 2022-11-11 | End: 2022-11-11

## 2022-11-11 RX ORDER — DIPHENHYDRAMINE HYDROCHLORIDE 50 MG/ML
25 INJECTION INTRAMUSCULAR; INTRAVENOUS ONCE
Status: COMPLETED | OUTPATIENT
Start: 2022-11-11 | End: 2022-11-11

## 2022-11-11 RX ADMIN — ACETAMINOPHEN 650 MG: 325 TABLET, FILM COATED ORAL at 17:29

## 2022-11-11 RX ADMIN — ONDANSETRON 4 MG: 4 TABLET, ORALLY DISINTEGRATING ORAL at 17:30

## 2022-11-11 RX ADMIN — DIPHENHYDRAMINE HYDROCHLORIDE 25 MG: 50 INJECTION INTRAMUSCULAR; INTRAVENOUS at 17:30

## 2022-11-11 ASSESSMENT — FIBROSIS 4 INDEX: FIB4 SCORE: 0.35

## 2022-11-12 NOTE — PROGRESS NOTES
1447-Pt here from [facility] with increased pulse due to bleeding. Pt denies any cramping or LOF. Pt reports heavy bleeding yesterday and more spotting today. Pt has history of needed blood transfusion during this pregnancy.   1455-Report given to Dr Paula. Orders received.  1500-IV started. Labs drawn   1630-Lab results back. Dr Paula at bedside. POC dicussed with pt. Order received to transfuse 2 units of PRBC.   1707-Consents signed.   1730-Pre transfusion medication given per MD orders and Blood transfusion started.   1820-Second unit started.  1900-Report given to Katia MARCELO

## 2022-11-12 NOTE — ED PROVIDER NOTES
"DATE OF ADMISSION:  2022     HISTORY OF PRESENT ILLNESS:  The patient is a 32-year-old , EDC 5/15/2023   who presents at 13 and 4/7th weeks' gestation after being sent to labor and   delivery from [facility] after being seen there for her   scheduled exam on ultrasound today.  The patient has had an ongoing   subchorionic hemorrhage with bleeding requiring blood transfusion throughout   the whole course of the first trimester and now into the second trimester.    She still has continued spotting and bleeding.  She has received blood   transfusions already.  Her last visit here though was on 10/17/2022.  While   with Dr. Au in [facility]'s office today, her heart rate was   in the 130s and she was still complaining of heavy bleed yesterday.  Today,   the bleeding has been less, she is having \"heavy spotting on the pad, not   quite filling the pad or saturating it completely.\"  Per Dr. Au's   conversation, the baby looked good.  The nuchal translucency measurement was   normal, but the patient had a cell-free DNA test that came back abnormal,   suspicious for triploidy.  She has an option to have an amniocentesis and will   call [facility] back if she wants to proceed with setting that up.  She is not   in any pain and she is not cramping at all.     PAST SURGICAL HISTORY:  None.     FAMILY HISTORY:  Father with diabetes, mother with kidney cancer.     SOCIAL HISTORY:  She denies any alcohol, tobacco or drug use.     ALLERGIES:  PENICILLIN.     GYNECOLOGIC HISTORY:  Last period, 2022. Again, no history of STDs.     OBSTETRIC HISTORY:  She is a  6, para 2.  She has had one miscarriage   and 1 , 2 living children, both born at term.     PHYSICAL EXAMINATION:  VITAL SIGNS:  Blood pressure 114/67, pulse 105; resting, afebrile at 97.9 and   she is satting 97% on room air, weight 227 pounds.  GENERAL:  Awake, alert, and oriented.  She is in no acute " distress.  CARDIOVASCULAR:  Tachycardic, regular rhythm.  CHEST:  Clear to auscultation bilaterally.  ABDOMEN:  Gravid, nontender, and nondistended.  EXTREMITIES:  No cyanosis, clubbing or edema.  PELVIC:  Sterile vaginal exam was deferred.  Evaluation of the fetus was   deferred as she just came from [facility] and Dr. Au's report   is being obtained.     LABORATORY DATA:  Current hemoglobin 7.8, hematocrit 25.1, and platelet count   400,000.  INR 1.13.  Fibrinogen 388.  PT 14.4. Blood type A positive.     ASSESSMENT AND PLAN:  A 32-year-old female  6, para 2 at 30 and 4/7th   weeks' gestation.  1.  Continued bleeding from persistent subchorionic hemorrhage, now again has   bled down to hematocrit of 25 and when she is up and moving, her heart rate is   about 130; however, at rest is about 105. We will plan to give her 2 units of   packed red blood cells.  Risks, benefits and alternatives were thoroughly   discussed with regard to this.  Risks were accepted and the patient desires to   proceed with blood transfusion at this time.  2.  Abnormal cell-free DNA testing.  She will make a decision whether she   wants to proceed with amniocentesis and follow up with [facility].  She will also follow up with Dr. Jauregui as scheduled in the office   soon.  All questions were answered at the bedside to the best of my ability   and to her satisfaction.        ______________________________  MD REJI HARPERP/[facility]    DD:  2022 17:12  DT:  2022 19:44    Job#:  [id number]

## 2022-11-12 NOTE — PROGRESS NOTES
1900-Report received from DAVIAN Quintanilla. POC discussed. RN at bedside. Second unit of PRBC currently transfusing. Pt denies needs at this time. Oriented to room and call light.   1928-Phoned Dr. Meyers, updated on pt status, orders received to discharge pt home after second unit has finished transfusing  2025-Pt discharged home, ambulatory. Provided general discharge instructions, understanding verbalized

## 2022-12-01 ENCOUNTER — APPOINTMENT (OUTPATIENT)
Dept: RADIOLOGY | Facility: MEDICAL CENTER | Age: 32
End: 2022-12-01
Attending: OBSTETRICS & GYNECOLOGY
Payer: MEDICAID

## 2022-12-01 ENCOUNTER — HOSPITAL ENCOUNTER (OUTPATIENT)
Facility: MEDICAL CENTER | Age: 32
End: 2022-12-01
Attending: OBSTETRICS & GYNECOLOGY | Admitting: OBSTETRICS & GYNECOLOGY
Payer: MEDICAID

## 2022-12-01 VITALS
HEART RATE: 93 BPM | SYSTOLIC BLOOD PRESSURE: 89 MMHG | DIASTOLIC BLOOD PRESSURE: 52 MMHG | RESPIRATION RATE: 16 BRPM | HEIGHT: 67 IN | OXYGEN SATURATION: 95 % | WEIGHT: 230 LBS | BODY MASS INDEX: 36.1 KG/M2 | TEMPERATURE: 98.6 F

## 2022-12-01 PROBLEM — O03.9 MISCARRIAGE: Status: RESOLVED | Noted: 2022-09-20 | Resolved: 2022-12-01

## 2022-12-01 LAB
ABO GROUP BLD: NORMAL
BARCODED ABORH UBTYP: 6200
BARCODED ABORH UBTYP: 6200
BARCODED PRD CODE UBPRD: NORMAL
BARCODED PRD CODE UBPRD: NORMAL
BARCODED UNIT NUM UBUNT: NORMAL
BARCODED UNIT NUM UBUNT: NORMAL
BASOPHILS # BLD AUTO: 0.3 % (ref 0–1.8)
BASOPHILS # BLD: 0.05 K/UL (ref 0–0.12)
BLD GP AB SCN SERPL QL: NORMAL
COMPONENT R 8504R: NORMAL
COMPONENT R 8504R: NORMAL
EOSINOPHIL # BLD AUTO: 0.05 K/UL (ref 0–0.51)
EOSINOPHIL NFR BLD: 0.3 % (ref 0–6.9)
ERYTHROCYTE [DISTWIDTH] IN BLOOD BY AUTOMATED COUNT: 48.1 FL (ref 35.9–50)
HCT VFR BLD AUTO: 19.4 % (ref 37–47)
HGB BLD-MCNC: 6 G/DL (ref 12–16)
IMM GRANULOCYTES # BLD AUTO: 0.34 K/UL (ref 0–0.11)
IMM GRANULOCYTES NFR BLD AUTO: 2.3 % (ref 0–0.9)
LYMPHOCYTES # BLD AUTO: 1.75 K/UL (ref 1–4.8)
LYMPHOCYTES NFR BLD: 11.8 % (ref 22–41)
MCH RBC QN AUTO: 22.9 PG (ref 27–33)
MCHC RBC AUTO-ENTMCNC: 30.9 G/DL (ref 33.6–35)
MCV RBC AUTO: 74 FL (ref 81.4–97.8)
MONOCYTES # BLD AUTO: 0.73 K/UL (ref 0–0.85)
MONOCYTES NFR BLD AUTO: 4.9 % (ref 0–13.4)
NEUTROPHILS # BLD AUTO: 11.88 K/UL (ref 2–7.15)
NEUTROPHILS NFR BLD: 80.4 % (ref 44–72)
NRBC # BLD AUTO: 0.11 K/UL
NRBC BLD-RTO: 0.7 /100 WBC
PLATELET # BLD AUTO: 484 K/UL (ref 164–446)
PMV BLD AUTO: 9.1 FL (ref 9–12.9)
PRODUCT TYPE UPROD: NORMAL
PRODUCT TYPE UPROD: NORMAL
RBC # BLD AUTO: 2.62 M/UL (ref 4.2–5.4)
RH BLD: NORMAL
UNIT STATUS USTAT: NORMAL
UNIT STATUS USTAT: NORMAL
WBC # BLD AUTO: 14.8 K/UL (ref 4.8–10.8)

## 2022-12-01 PROCEDURE — 86850 RBC ANTIBODY SCREEN: CPT

## 2022-12-01 PROCEDURE — 85025 COMPLETE CBC W/AUTO DIFF WBC: CPT

## 2022-12-01 PROCEDURE — A9270 NON-COVERED ITEM OR SERVICE: HCPCS | Performed by: OBSTETRICS & GYNECOLOGY

## 2022-12-01 PROCEDURE — 700111 HCHG RX REV CODE 636 W/ 250 OVERRIDE (IP): Performed by: OBSTETRICS & GYNECOLOGY

## 2022-12-01 PROCEDURE — 86901 BLOOD TYPING SEROLOGIC RH(D): CPT

## 2022-12-01 PROCEDURE — P9016 RBC LEUKOCYTES REDUCED: HCPCS | Mod: 91

## 2022-12-01 PROCEDURE — 700102 HCHG RX REV CODE 250 W/ 637 OVERRIDE(OP): Performed by: OBSTETRICS & GYNECOLOGY

## 2022-12-01 PROCEDURE — 36430 TRANSFUSION BLD/BLD COMPNT: CPT

## 2022-12-01 PROCEDURE — 76815 OB US LIMITED FETUS(S): CPT

## 2022-12-01 PROCEDURE — 36415 COLL VENOUS BLD VENIPUNCTURE: CPT

## 2022-12-01 PROCEDURE — 86900 BLOOD TYPING SEROLOGIC ABO: CPT

## 2022-12-01 PROCEDURE — 96374 THER/PROPH/DIAG INJ IV PUSH: CPT

## 2022-12-01 PROCEDURE — 86923 COMPATIBILITY TEST ELECTRIC: CPT

## 2022-12-01 RX ORDER — DIPHENHYDRAMINE HYDROCHLORIDE 50 MG/ML
25 INJECTION INTRAMUSCULAR; INTRAVENOUS ONCE
Status: COMPLETED | OUTPATIENT
Start: 2022-12-01 | End: 2022-12-01

## 2022-12-01 RX ORDER — ACETAMINOPHEN 325 MG/1
650 TABLET ORAL ONCE
Status: COMPLETED | OUTPATIENT
Start: 2022-12-01 | End: 2022-12-01

## 2022-12-01 RX ADMIN — DIPHENHYDRAMINE HYDROCHLORIDE 25 MG: 50 INJECTION INTRAMUSCULAR; INTRAVENOUS at 14:23

## 2022-12-01 RX ADMIN — ACETAMINOPHEN 650 MG: 325 TABLET, FILM COATED ORAL at 14:23

## 2022-12-01 ASSESSMENT — FIBROSIS 4 INDEX: FIB4 SCORE: 0.33

## 2022-12-01 NOTE — PROGRESS NOTES
Date: 2022    Patient ID: 32-year-old  6 para 2-0-2-2 at 16+3 weeks by last menstrual period (EDC 5/15/2023)    Chief complaint: Severe anemia.    History of present illness: The patient has prenatal care with Dr. Ace.  Her pregnancy has been complicated by a subchorionic hemorrhage with severe vaginal bleeding resulting in anemia.  She is [facility].  She was last seen on .  She had NIPT testing that shows high risk for Tri ploidy.  She is status post consultation with a [facility] and has declined amniocentesis.  She has a history of a molar pregnancy with a risk of 10% recurrence.    The patient had lab work done in our office 2 days ago and her hemoglobin was 6.7.  This will be her third blood transfusion during this pregnancy.  The patient reports that she will have large gushes of blood that will resolve on their own.  She is not actively bleeding at this time.  She did more recently have another bleed but states right now she is not bleeding. The blood has been more brown and has an odor.  She denies abdominal pain.  She denies shortness of breath or chest pain.    Past medical history: Denies.    Past surgical history: Denies.    Family history: Father with diabetes otherwise noncontributory.    Social history: Denies tobacco use, alcohol use or drug use.    OB history: Patient has a history of 2 term normal spontaneous vaginal deliveries.  She also has a history of a molar pregnancy.  Is also had 1 termination.    GYN history: Last menstrual period 2022.  Denies history of sexual transmitted infections or genital herpes.    Physical exam:  Vitals:    22 1220   BP: 116/62   Pulse: 100   Resp: 18   Temp: 36.4 °C (97.5 °F)   SpO2: 95%   General: Alert, conversational, pleasant, no acute distress  Cardiovascular: Regular rate  Pulmonary: No respiratory distress, symmetric expansion  Abdomen: Soft, nontender,  non-distended  Genitourinary: Deferred  Extremities: Moves all, no edema    NST: Fetal Dopplers, heart rate with appropriate heart rate for gestational age.  Tocometer quiet.    Laboratory studies: Hemoglobin 6.0, white blood cell count 14.8, platelet count 484.    Imaging: No placenta previa. CL 4.3cm    Assessment/plan:  32-year-old  6 para 2-0-2-2 at 16+3 weeks by last menstrual period (EDC 5/15/2023)    1.  Intrauterine pregnancy at 13+6 weeks.  2.  Chronic subchorionic hemorrhage.  3.  Anemia due to acute on chronic blood loss.    Plan:  1.  Transfused 2 units packed red blood cells given severe anemia.  2.  Transvaginal ultrasound to rule out placenta previa and/or cervical shortening.  3.  Okay to discharge home, bleeding precautions discussed. Follow up with Dr. Bain. Rx for ferrous sulfate 325mg PO BID.  4. Follow up for amniocentesis on .     Jade Britton MD

## 2022-12-02 ENCOUNTER — HOSPITAL ENCOUNTER (INPATIENT)
Facility: MEDICAL CENTER | Age: 32
LOS: 3 days | DRG: 818 | End: 2022-12-05
Attending: EMERGENCY MEDICINE | Admitting: OBSTETRICS & GYNECOLOGY
Payer: MEDICAID

## 2022-12-02 DIAGNOSIS — N93.9 VAGINAL BLEEDING: ICD-10-CM

## 2022-12-02 LAB
ABO GROUP BLD: NORMAL
ANION GAP SERPL CALC-SCNC: 11 MMOL/L (ref 7–16)
ANISOCYTOSIS BLD QL SMEAR: ABNORMAL
APTT PPP: 27.5 SEC (ref 24.7–36)
BARCODED ABORH UBTYP: 6200
BARCODED PRD CODE UBPRD: NORMAL
BARCODED UNIT NUM UBUNT: NORMAL
BASOPHILS # BLD AUTO: 0 % (ref 0–1.8)
BASOPHILS # BLD: 0 K/UL (ref 0–0.12)
BLD GP AB SCN SERPL QL: NORMAL
BUN SERPL-MCNC: 10 MG/DL (ref 8–22)
CALCIUM SERPL-MCNC: 8.8 MG/DL (ref 8.5–10.5)
CHLORIDE SERPL-SCNC: 105 MMOL/L (ref 96–112)
CO2 SERPL-SCNC: 20 MMOL/L (ref 20–33)
COMPONENT R 8504R: NORMAL
CREAT SERPL-MCNC: 0.59 MG/DL (ref 0.5–1.4)
DACRYOCYTES BLD QL SMEAR: NORMAL
EOSINOPHIL # BLD AUTO: 0.14 K/UL (ref 0–0.51)
EOSINOPHIL NFR BLD: 0.8 % (ref 0–6.9)
ERYTHROCYTE [DISTWIDTH] IN BLOOD BY AUTOMATED COUNT: 48.8 FL (ref 35.9–50)
ERYTHROCYTE [DISTWIDTH] IN BLOOD BY AUTOMATED COUNT: 49.4 FL (ref 35.9–50)
GFR SERPLBLD CREATININE-BSD FMLA CKD-EPI: 121 ML/MIN/1.73 M 2
GLUCOSE SERPL-MCNC: 113 MG/DL (ref 65–99)
HCT VFR BLD AUTO: 23.2 % (ref 37–47)
HCT VFR BLD AUTO: 26 % (ref 37–47)
HGB BLD-MCNC: 7.3 G/DL (ref 12–16)
HGB BLD-MCNC: 8.6 G/DL (ref 12–16)
HYPOCHROMIA BLD QL SMEAR: ABNORMAL
INR PPP: 1.12 (ref 0.87–1.13)
LYMPHOCYTES # BLD AUTO: 1.62 K/UL (ref 1–4.8)
LYMPHOCYTES NFR BLD: 9.5 % (ref 22–41)
MANUAL DIFF BLD: NORMAL
MCH RBC QN AUTO: 24.3 PG (ref 27–33)
MCH RBC QN AUTO: 25.9 PG (ref 27–33)
MCHC RBC AUTO-ENTMCNC: 31.5 G/DL (ref 33.6–35)
MCHC RBC AUTO-ENTMCNC: 33.1 G/DL (ref 33.6–35)
MCV RBC AUTO: 77.1 FL (ref 81.4–97.8)
MCV RBC AUTO: 78.3 FL (ref 81.4–97.8)
MICROCYTES BLD QL SMEAR: ABNORMAL
MONOCYTES # BLD AUTO: 0.29 K/UL (ref 0–0.85)
MONOCYTES NFR BLD AUTO: 1.7 % (ref 0–13.4)
MORPHOLOGY BLD-IMP: NORMAL
MYELOCYTES NFR BLD MANUAL: 0.9 %
NEUTROPHILS # BLD AUTO: 14.81 K/UL (ref 2–7.15)
NEUTROPHILS NFR BLD: 87.1 % (ref 44–72)
NRBC # BLD AUTO: 0.22 K/UL
NRBC BLD-RTO: 1.3 /100 WBC
PLATELET # BLD AUTO: 378 K/UL (ref 164–446)
PLATELET # BLD AUTO: 450 K/UL (ref 164–446)
PLATELET BLD QL SMEAR: NORMAL
PMV BLD AUTO: 9 FL (ref 9–12.9)
PMV BLD AUTO: 9.1 FL (ref 9–12.9)
POIKILOCYTOSIS BLD QL SMEAR: NORMAL
POLYCHROMASIA BLD QL SMEAR: NORMAL
POTASSIUM SERPL-SCNC: 3.7 MMOL/L (ref 3.6–5.5)
PRODUCT TYPE UPROD: NORMAL
PROTHROMBIN TIME: 14.3 SEC (ref 12–14.6)
RBC # BLD AUTO: 3.01 M/UL (ref 4.2–5.4)
RBC # BLD AUTO: 3.32 M/UL (ref 4.2–5.4)
RBC BLD AUTO: PRESENT
RH BLD: NORMAL
SODIUM SERPL-SCNC: 136 MMOL/L (ref 135–145)
TOXIC GRANULES BLD QL SMEAR: SLIGHT
UNIT STATUS USTAT: NORMAL
WBC # BLD AUTO: 13.2 K/UL (ref 4.8–10.8)
WBC # BLD AUTO: 17 K/UL (ref 4.8–10.8)

## 2022-12-02 PROCEDURE — 700111 HCHG RX REV CODE 636 W/ 250 OVERRIDE (IP): Performed by: OBSTETRICS & GYNECOLOGY

## 2022-12-02 PROCEDURE — 85025 COMPLETE CBC W/AUTO DIFF WBC: CPT

## 2022-12-02 PROCEDURE — 86900 BLOOD TYPING SEROLOGIC ABO: CPT

## 2022-12-02 PROCEDURE — 85027 COMPLETE CBC AUTOMATED: CPT

## 2022-12-02 PROCEDURE — 85007 BL SMEAR W/DIFF WBC COUNT: CPT

## 2022-12-02 PROCEDURE — 770002 HCHG ROOM/CARE - OB PRIVATE (112)

## 2022-12-02 PROCEDURE — 85610 PROTHROMBIN TIME: CPT

## 2022-12-02 PROCEDURE — A9270 NON-COVERED ITEM OR SERVICE: HCPCS | Performed by: OBSTETRICS & GYNECOLOGY

## 2022-12-02 PROCEDURE — 36430 TRANSFUSION BLD/BLD COMPNT: CPT

## 2022-12-02 PROCEDURE — 99285 EMERGENCY DEPT VISIT HI MDM: CPT

## 2022-12-02 PROCEDURE — 30233N1 TRANSFUSION OF NONAUTOLOGOUS RED BLOOD CELLS INTO PERIPHERAL VEIN, PERCUTANEOUS APPROACH: ICD-10-PCS | Performed by: OBSTETRICS & GYNECOLOGY

## 2022-12-02 PROCEDURE — 86923 COMPATIBILITY TEST ELECTRIC: CPT

## 2022-12-02 PROCEDURE — 700102 HCHG RX REV CODE 250 W/ 637 OVERRIDE(OP): Performed by: OBSTETRICS & GYNECOLOGY

## 2022-12-02 PROCEDURE — 36415 COLL VENOUS BLD VENIPUNCTURE: CPT

## 2022-12-02 PROCEDURE — 80048 BASIC METABOLIC PNL TOTAL CA: CPT

## 2022-12-02 PROCEDURE — 700105 HCHG RX REV CODE 258: Performed by: OBSTETRICS & GYNECOLOGY

## 2022-12-02 PROCEDURE — 86901 BLOOD TYPING SEROLOGIC RH(D): CPT

## 2022-12-02 PROCEDURE — 86850 RBC ANTIBODY SCREEN: CPT

## 2022-12-02 PROCEDURE — P9016 RBC LEUKOCYTES REDUCED: HCPCS

## 2022-12-02 PROCEDURE — 96372 THER/PROPH/DIAG INJ SC/IM: CPT

## 2022-12-02 PROCEDURE — 85730 THROMBOPLASTIN TIME PARTIAL: CPT

## 2022-12-02 RX ORDER — ACETAMINOPHEN 325 MG/1
650 TABLET ORAL ONCE
Status: COMPLETED | OUTPATIENT
Start: 2022-12-02 | End: 2022-12-02

## 2022-12-02 RX ORDER — OXYTOCIN 10 [USP'U]/ML
10 INJECTION, SOLUTION INTRAMUSCULAR; INTRAVENOUS
Status: DISCONTINUED | OUTPATIENT
Start: 2022-12-02 | End: 2022-12-05 | Stop reason: HOSPADM

## 2022-12-02 RX ORDER — DIPHENHYDRAMINE HYDROCHLORIDE 50 MG/ML
25 INJECTION INTRAMUSCULAR; INTRAVENOUS ONCE
Status: COMPLETED | OUTPATIENT
Start: 2022-12-02 | End: 2022-12-02

## 2022-12-02 RX ORDER — HYDROXYZINE HYDROCHLORIDE 25 MG/1
25 TABLET, FILM COATED ORAL 3 TIMES DAILY PRN
Status: DISCONTINUED | OUTPATIENT
Start: 2022-12-02 | End: 2022-12-05 | Stop reason: HOSPADM

## 2022-12-02 RX ORDER — SODIUM CHLORIDE, SODIUM LACTATE, POTASSIUM CHLORIDE, CALCIUM CHLORIDE 600; 310; 30; 20 MG/100ML; MG/100ML; MG/100ML; MG/100ML
INJECTION, SOLUTION INTRAVENOUS CONTINUOUS
Status: DISCONTINUED | OUTPATIENT
Start: 2022-12-02 | End: 2022-12-05 | Stop reason: HOSPADM

## 2022-12-02 RX ORDER — MISOPROSTOL 200 UG/1
400 TABLET ORAL EVERY 4 HOURS
Status: DISCONTINUED | OUTPATIENT
Start: 2022-12-02 | End: 2022-12-03

## 2022-12-02 RX ORDER — METHYLERGONOVINE MALEATE 0.2 MG/ML
0.2 INJECTION INTRAVENOUS
Status: COMPLETED | OUTPATIENT
Start: 2022-12-02 | End: 2022-12-03

## 2022-12-02 RX ORDER — METHYLERGONOVINE MALEATE 0.2 MG/ML
0.2 INJECTION INTRAVENOUS PRN
Status: DISCONTINUED | OUTPATIENT
Start: 2022-12-02 | End: 2022-12-02

## 2022-12-02 RX ORDER — ALPRAZOLAM 0.25 MG/1
0.25 TABLET ORAL NIGHTLY PRN
Status: DISCONTINUED | OUTPATIENT
Start: 2022-12-02 | End: 2022-12-05 | Stop reason: HOSPADM

## 2022-12-02 RX ORDER — ONDANSETRON 2 MG/ML
4 INJECTION INTRAMUSCULAR; INTRAVENOUS ONCE
Status: COMPLETED | OUTPATIENT
Start: 2022-12-02 | End: 2022-12-03

## 2022-12-02 RX ADMIN — ACETAMINOPHEN 650 MG: 325 TABLET, FILM COATED ORAL at 05:00

## 2022-12-02 RX ADMIN — DIPHENHYDRAMINE HYDROCHLORIDE 25 MG: 50 INJECTION INTRAMUSCULAR; INTRAVENOUS at 05:01

## 2022-12-02 RX ADMIN — ALPRAZOLAM 0.25 MG: 0.25 TABLET ORAL at 17:53

## 2022-12-02 RX ADMIN — SODIUM CHLORIDE, POTASSIUM CHLORIDE, SODIUM LACTATE AND CALCIUM CHLORIDE: 600; 310; 30; 20 INJECTION, SOLUTION INTRAVENOUS at 21:57

## 2022-12-02 RX ADMIN — MISOPROSTOL 400 MCG: 200 TABLET ORAL at 15:37

## 2022-12-02 RX ADMIN — HYDROXYZINE HYDROCHLORIDE 25 MG: 25 TABLET, FILM COATED ORAL at 15:59

## 2022-12-02 RX ADMIN — MISOPROSTOL 400 MCG: 200 TABLET ORAL at 19:39

## 2022-12-02 ASSESSMENT — FIBROSIS 4 INDEX
FIB4 SCORE: 0.27
FIB4 SCORE: 0.35

## 2022-12-02 NOTE — ED TRIAGE NOTES
"Pt BIBA via [facility].    Chief Complaint   Patient presents with    Vaginal Bleeding     Large amounts of vaginal bleeding and feeling \"heavy\". Here yesterday and received 2 units of blood. 3rd transfusion in recent past. 16 weeks pregnant. Dx of subchorionic hemorrhage      ABCs intact. A+OX4. Pt denies CP/SOB. Pt MALONEY and skin pale warm and dry. Vitals and tele on the monitor  "

## 2022-12-02 NOTE — PROGRESS NOTES
0700: Report received from DAVIAN Mix.     0730: Assessment completed, pt declines feeling dizzy or lightheaded at this time. Pt ambulated to the bathroom with a steady gait.  Pt declines any VB when up to the bathroom. Small clot noted on pad on the bed.

## 2022-12-02 NOTE — DISCHARGE INSTRUCTIONS
Vaginal Bleeding During Pregnancy, First Trimester    A small amount of bleeding (spotting) from the vagina is common during early pregnancy. Sometimes the bleeding is normal and does not cause problems. At other times, though, bleeding may be a sign of something serious. Tell your doctor about any bleeding from your vagina right away.  Follow these instructions at home:  Activity  Follow your doctor's instructions about how active you can be.  If needed, make plans for someone to help with your normal activities.  Do not have sex or orgasms until your doctor says that this is safe.  General instructions  Take over-the-counter and prescription medicines only as told by your doctor.  Watch your condition for any changes.  Write down:  The number of pads you use each day.  How often you change pads.  How soaked (saturated) your pads are.  Do not use tampons.  Do not douche.  If you pass any tissue from your vagina, save it to show to your doctor.  Keep all follow-up visits as told by your doctor. This is important.  Contact a doctor if:  You have vaginal bleeding at any time while you are pregnant.  You have cramps.  You have a fever.  Get help right away if:  You have very bad cramps in your back or belly (abdomen).  You pass large clots or a lot of tissue from your vagina.  Your bleeding gets worse.  You feel light-headed.  You feel weak.  You pass out (faint).  You have chills.  You are leaking fluid from your vagina.  You have a gush of fluid from your vagina.  Summary  Sometimes vaginal bleeding during pregnancy is normal and does not cause problems. At other times, bleeding may be a sign of something serious.  Tell your doctor about any bleeding from your vagina right away.  Follow your doctor's instructions about how active you can be. You may need someone to help you with your normal activities.  This information is not intended to replace advice given to you by your health care provider. Make sure you discuss  any questions you have with your health care provider.  Document Released: 05/04/2015 Document Revised: 04/07/2020 Document Reviewed: 03/21/2018  Elsevier Patient Education © 2020 [employer]

## 2022-12-02 NOTE — ED PROVIDER NOTES
"ED Provider Note        Primary care provider: Pcp Pt States None    I verified that the patient was wearing a mask and I was wearing appropriate PPE every time I entered the room. The patient's mask was on the patient at all times during my encounter except for a brief view of the oropharynx.      CHIEF COMPLAINT  Chief Complaint   Patient presents with    Vaginal Bleeding     Large amounts of vaginal bleeding and feeling \"heavy\". Here yesterday and received 2 units of blood. 3rd transfusion in recent past. 16 weeks pregnant. Dx of subchorionic hemorrhage        HPI  Martha Rubio is a 32 y.o. T2 L2 female who presents to the Emergency Department with chief complaint of vaginal bleeding.  Patient was at OB/GYN triage last night and received 2 units of PRBCs.  She vaginal bleeding had slowed she went home she reports that this evening she woke up about midnight and was having large amount of vaginal bleeding.  She reports that in approximately 2 hours she soaked through 3 heavy nighttime maxi pads.  Patient reports that she has had some slight posterior headache some lightheadedness and minimal palpitations.  She denies abdominal pain at this time she has had no urinary or stooling complaints.  She is approximately 16 weeks pregnant has had ongoing bleeding throughout this pregnancy due to presumed subchorionic hemorrhage.  This was also initially a twin gestation and she lost one of the twins at approximately 6 weeks.    REVIEW OF SYSTEMS  10 systems reviewed and otherwise negative, pertinent positives and negatives listed in the history of present illness.    PAST MEDICAL HISTORY       SURGICAL HISTORY  patient denies any surgical history    SOCIAL HISTORY  Social History     Tobacco Use    Smoking status: Never    Smokeless tobacco: Never   Substance Use Topics    Alcohol use: Not Currently    Drug use: Not Currently      Social History     Substance and Sexual Activity   Drug Use Not " "Currently       FAMILY HISTORY  Non-Contributory    CURRENT MEDICATIONS  Home Medications       Reviewed by Adina Sinclair R.N. (Registered Nurse) on 12/02/22 at 0239  Med List Status: <None>     Medication Last Dose Status   acetaminophen (TYLENOL) 500 MG Tab  Active   ferrous sulfate 325 (65 Fe) MG tablet  Active   Prenatal Vit-Fe Fumarate-FA (PRENATAL PO)  Active                    ALLERGIES  Allergies   Allergen Reactions    Codeine Unspecified     \"Chest pain\"    Penicillins Rash             PHYSICAL EXAM  VITAL SIGNS: /59   Pulse 95   Temp 36.9 °C (98.4 °F) (Temporal)   Resp 19   Ht 1.676 m (5' 6\")   Wt 104 kg (230 lb)   LMP 08/08/2022 (Exact Date)   BMI 37.12 kg/m²   Pulse ox interpretation: I interpret this pulse ox as normal.  Constitutional: Alert and oriented x 3, minimal distress  HEENT: Atraumatic normocephalic, pupils are equal round, extraocular movements are intact. The nares is clear, external ears are normal, mouth shows moist mucous membranes  Neck: no obvious JVD or tracheal deviation  Cardiovascular:  tachycardic no murmur rub or gallop   Thorax & Lungs: No respiratory distress, no wheezes rales or rhonchi, No chest tenderness.   GI: Soft nontender nondistended positive bowel sounds, no peritoneal signs  Skin: Warm dry no obvious acute rash or lesion  Musculoskeletal: Moving all extremities with normal range strength, no acute  deformity  Neurologic: Cranial nerves III through XII are grossly intact, no sensory deficit, no cerebellar dysfunction   Psychiatric: Appropriate affect for situation at this time      DIAGNOSTIC STUDIES / PROCEDURES  LABS      Results for orders placed or performed during the hospital encounter of 12/02/22   CBC WITHOUT DIFFERENTIAL   Result Value Ref Range    WBC 13.2 (H) 4.8 - 10.8 K/uL    RBC 3.01 (L) 4.20 - 5.40 M/uL    Hemoglobin 7.3 (L) 12.0 - 16.0 g/dL    Hematocrit 23.2 (L) 37.0 - 47.0 %    MCV 77.1 (L) 81.4 - 97.8 fL    MCH 24.3 (L) 27.0 - 33.0 " pg    MCHC 31.5 (L) 33.6 - 35.0 g/dL    RDW 49.4 35.9 - 50.0 fL    Platelet Count 450 (H) 164 - 446 K/uL    MPV 9.0 9.0 - 12.9 fL   BASIC METABOLIC PANEL   Result Value Ref Range    Sodium 136 135 - 145 mmol/L    Potassium 3.7 3.6 - 5.5 mmol/L    Chloride 105 96 - 112 mmol/L    Co2 20 20 - 33 mmol/L    Glucose 113 (H) 65 - 99 mg/dL    Bun 10 8 - 22 mg/dL    Creatinine 0.59 0.50 - 1.40 mg/dL    Calcium 8.8 8.5 - 10.5 mg/dL    Anion Gap 11.0 7.0 - 16.0   PROTHROMBIN TIME   Result Value Ref Range    PT 14.3 12.0 - 14.6 sec    INR 1.12 0.87 - 1.13   APTT   Result Value Ref Range    APTT 27.5 24.7 - 36.0 sec   COD (ADULT)   Result Value Ref Range    ABO Grouping Only A     Rh Grouping Only POS     Antibody Screen-Cod NEG    ESTIMATED GFR   Result Value Ref Range    GFR (CKD-EPI) 121 >60 mL/min/1.73 m 2     All labs reviewed by me.        COURSE & MEDICAL DECISION MAKING  Pertinent Labs & Imaging studies reviewed. (See chart for details)    3:13 AM - Patient seen and examined at bedside.       Patient noted to have slightly elevated blood pressure likely circumstantial secondary to presenting complaint. Referred to primary care physician for further evaluation.    Medical Decision Making: Patient presents tachycardic her hemoglobin is here is 7.3 I would expect a higher reaction to her transfusions yesterday.  She reports that her bleeding is slightly slowed however she soaks through 3 large maxi pads in a couple hours prior to arrival.  I discussed her case up to this point with gynecologist/obstetrician Dr. Britton.  Were in agreement that patient will probably require ongoing evaluation and treatment and likely blood transfusion.  Dr. Britton advises transferring the patient to the labor and delivery floor where she can perform more thorough evaluation and treatment.  Patient transferred to labor and delivery in guarded condition.    /59   Pulse 95   Temp 36.9 °C (98.4 °F) (Temporal)   Resp 19   Ht 1.676 m (5'  "6\")   Wt 104 kg (230 lb)   LMP 08/08/2022 (Exact Date)   BMI 37.12 kg/m²         Current Discharge Medication List          FINAL IMPRESSION  1. Vaginal bleeding     2.  Acute blood loss anemia      This dictation has been created using voice recognition software and/or scribes. The accuracy of the dictation is limited by the abilities of the software and the expertise of the scribes. I expect there may be some errors of grammar and possibly content. I made every attempt to manually correct the errors within my dictation. However, errors related to voice recognition software and/or scribes may still exist and should be interpreted within the appropriate context.            "

## 2022-12-02 NOTE — H&P
History and physical exam    32-year-old -0-3-2 who is here today after being brought in by emergency medical services for vaginal bleeding  This patient has a known history of vaginal bleeding with this pregnancy, prior to this she has received 5 units of blood products for continuing anemia  In the last 24 hours she has received 4 units of blood products totaling 9 units  Patient has been followed for what is possibly a subchorionic hemorrhage  She had been fairly stable for a number of weeks, however had bleeding 3 days ago and was seen in our office and had a CBC done showing a hemoglobin of 6  She was referred to the hospital yesterday and received 2 units of blood, she was not bleeding at that time.  She was discharged home later that evening and early this morning she started having again bright red blood per vagina.  At that point they called an ambulance and was brought in by emergency medical services to the emergency room and ultimately transferred to labor and delivery  Today the patient is feeling okay, she feels better after 2 units of blood products this morning  She reports spotting only no more significant vaginal bleeding.  She denies any pelvic pain contractions, abnormal vaginal discharge    Past medical history: Asthma, and anemia  Past surgical history none  Medications prenatal vitamins and iron  Allergies to medications: Penicillin and codeine    Obstetrical history   in  and   Molar pregnancy with last pregnancy  1 spontaneous , 1 termination    Physical exam  Patient Vitals for the past 24 hrs:   BP Temp Temp src Pulse Resp SpO2 Height Weight   22 0843 115/55 36.4 °C (97.5 °F) Temporal 88 16 -- -- --   22 0736 -- -- -- 84 -- 96 % -- --   22 0733 103/52 -- -- 80 -- -- -- --   22 0731 -- -- -- 83 -- 95 % -- --   22 0726 -- -- -- 86 -- 95 % -- --   22 0721 -- -- -- 84 -- 95 % -- --   22 0718 100/55 -- -- 88 -- -- -- --    12/02/22 0716 -- -- -- 86 -- 95 % -- --   12/02/22 0711 -- -- -- 86 -- 96 % -- --   12/02/22 0706 -- -- -- 86 -- 96 % -- --   12/02/22 0703 (!) 95/53 -- -- 84 -- -- -- --   12/02/22 0701 -- -- -- 86 -- 94 % -- --   12/02/22 0656 -- -- -- 94 -- 93 % -- --   12/02/22 0651 -- -- -- 87 -- 93 % -- --   12/02/22 0648 (!) 93/54 -- -- 86 -- -- -- --   12/02/22 0645 (!) 91/50 36.7 °C (98.1 °F) Temporal 90 16 96 % -- --   12/02/22 0641 -- -- -- 86 -- 95 % -- --   12/02/22 0636 -- -- -- 86 -- 94 % -- --   12/02/22 0633 -- -- -- 85 -- -- -- --   12/02/22 0630 (!) 85/53 -- -- 87 -- 95 % -- --   12/02/22 0626 -- -- -- 89 -- 95 % -- --   12/02/22 0621 -- -- -- 86 -- 95 % -- --   12/02/22 0618 -- -- -- (!) 103 -- -- -- --   12/02/22 0615 (!) 96/50 -- -- 87 -- 93 % -- --   12/02/22 0611 -- -- -- 88 -- 95 % -- --   12/02/22 0607 (!) 96/50 36.7 °C (98 °F) -- 85 16 95 % -- --   12/02/22 0606 -- -- -- 86 -- 95 % -- --   12/02/22 0603 -- -- -- 82 -- -- -- --   12/02/22 0600 (!) 94/52 -- -- 90 -- 93 % -- --   12/02/22 0556 -- -- -- 88 -- 94 % -- --   12/02/22 0551 -- -- -- 85 -- 96 % -- --   12/02/22 0548 -- -- -- 85 -- -- -- --   12/02/22 0545 (!) 94/50 -- -- 86 -- 96 % -- --   12/02/22 0541 -- -- -- 86 -- 95 % -- --   12/02/22 0536 -- -- -- 82 -- 95 % -- --   12/02/22 0533 -- -- -- 80 -- -- -- --   12/02/22 0530 -- -- -- 82 -- 94 % -- --   12/02/22 0526 -- -- -- 84 -- 95 % -- --   12/02/22 0520 (!) 94/49 36.7 °C (98 °F) Temporal 79 18 97 % -- --   12/02/22 0518 -- -- -- 80 -- -- -- --   12/02/22 0515 -- -- -- 95 -- 96 % -- --   12/02/22 0510 105/54 -- -- 95 -- 94 % -- --   12/02/22 0506 -- -- -- 92 -- 98 % -- --   12/02/22 0505 -- -- -- 87 -- -- -- --   12/02/22 0503 105/54 36.7 °C (98 °F) -- 88 17 97 % -- --   12/02/22 0500 -- -- -- 90 -- 95 % -- --   12/02/22 0456 -- -- -- 95 -- 96 % -- --   12/02/22 0450 -- -- -- 89 -- 96 % -- --   12/02/22 0446 -- -- -- 91 -- 96 % -- --   12/02/22 0440 114/53 -- -- 95 -- 97 % -- --   12/02/22 0436  -- -- -- 86 -- 95 % -- --   12/02/22 0420 -- -- -- (!) 107 20 97 % -- --   12/02/22 0418 -- -- -- 97 16 96 % -- --   12/02/22 0417 -- -- -- 90 13 96 % -- --   12/02/22 0416 -- -- -- 90 15 98 % -- --   12/02/22 0415 -- -- -- 90 19 97 % -- --   12/02/22 0414 -- -- -- 98 (!) 78 96 % -- --   12/02/22 0413 -- -- -- 93 16 97 % -- --   12/02/22 0412 -- -- -- 89 (!) 29 98 % -- --   12/02/22 0411 -- -- -- 89 12 97 % -- --   12/02/22 0410 -- -- -- 89 (!) 30 96 % -- --   12/02/22 0409 -- -- -- 88 (!) 23 97 % -- --   12/02/22 0408 -- -- -- 89 16 95 % -- --   12/02/22 0407 -- -- -- 91 19 96 % -- --   12/02/22 0406 -- -- -- 90 17 96 % -- --   12/02/22 0405 -- -- -- 90 18 97 % -- --   12/02/22 0404 -- -- -- 94 (!) 28 97 % -- --   12/02/22 0403 -- -- -- 90 20 97 % -- --   12/02/22 0402 -- -- -- 93 (!) 27 97 % -- --   12/02/22 0401 -- -- -- 93 19 99 % -- --   12/02/22 0400 121/58 -- -- 99 (!) 23 98 % -- --   12/02/22 0359 -- -- -- 88 16 97 % -- --   12/02/22 0358 -- -- -- 91 (!) 22 95 % -- --   12/02/22 0357 -- -- -- 89 17 95 % -- --   12/02/22 0356 -- -- -- 90 (!) 27 96 % -- --   12/02/22 0355 -- -- -- 88 17 93 % -- --   12/02/22 0354 -- -- -- (!) 105 (!) 25 96 % -- --   12/02/22 0353 -- -- -- 84 16 96 % -- --   12/02/22 0352 -- -- -- 88 16 98 % -- --   12/02/22 0351 -- -- -- 95 16 99 % -- --   12/02/22 0350 -- -- -- 88 16 98 % -- --   12/02/22 0349 -- -- -- 92 17 98 % -- --   12/02/22 0348 -- -- -- 92 (!) 38 96 % -- --   12/02/22 0347 -- -- -- 88 20 96 % -- --   12/02/22 0346 -- -- -- 90 18 97 % -- --   12/02/22 0345 -- -- -- 91 15 96 % -- --   12/02/22 0344 -- -- -- (!) 106 19 96 % -- --   12/02/22 0343 -- -- -- 88 15 98 % -- --   12/02/22 0342 -- -- -- 90 (!) 21 98 % -- --   12/02/22 0341 -- -- -- 93 (!) 11 100 % -- --   12/02/22 0340 -- -- -- 93 14 99 % -- --   12/02/22 0339 -- -- -- 93 17 98 % -- --   12/02/22 0338 -- -- -- (!) 104 (!) 28 99 % -- --   12/02/22 0337 -- -- -- (!) 105 (!) 21 97 % -- --   12/02/22 0336 -- -- -- 97  14 98 % -- --   12/02/22 0335 -- -- -- 93 17 98 % -- --   12/02/22 0334 -- -- -- 94 17 97 % -- --   12/02/22 0333 -- -- -- 92 19 96 % -- --   12/02/22 0332 -- -- -- 100 19 99 % -- --   12/02/22 0331 -- -- -- (!) 104 (!) 44 99 % -- --   12/02/22 0330 -- -- -- (!) 103 (!) 29 99 % -- --   12/02/22 0329 -- -- -- (!) 103 (!) 44 98 % -- --   12/02/22 0328 -- -- -- (!) 109 (!) 25 99 % -- --   12/02/22 0327 -- -- -- (!) 115 (!) 48 99 % -- --   12/02/22 0326 -- -- -- 95 (!) 32 98 % -- --   12/02/22 0325 -- -- -- 99 (!) 26 97 % -- --   12/02/22 0324 -- -- -- 96 20 98 % -- --   12/02/22 0323 -- -- -- 98 (!) 23 97 % -- --   12/02/22 0322 -- -- -- 94 (!) 44 96 % -- --   12/02/22 0321 -- -- -- 96 15 98 % -- --   12/02/22 0320 -- -- -- 97 (!) 48 97 % -- --   12/02/22 0319 -- -- -- 98 18 97 % -- --   12/02/22 0318 -- -- -- 96 16 98 % -- --   12/02/22 0317 -- -- -- (!) 101 16 98 % -- --   12/02/22 0316 -- -- -- 98 17 98 % -- --   12/02/22 0315 -- -- -- (!) 101 (!) 22 99 % -- --   12/02/22 0314 -- -- -- 94 20 98 % -- --   12/02/22 0313 -- -- -- 98 18 97 % -- --   12/02/22 0312 -- -- -- 93 (!) 25 98 % -- --   12/02/22 0311 -- -- -- 94 (!) 22 99 % -- --   12/02/22 0310 -- -- -- 93 13 98 % -- --   12/02/22 0309 -- -- -- 96 16 99 % -- --   12/02/22 0308 -- -- -- 97 12 97 % -- --   12/02/22 0307 -- -- -- 97 18 97 % -- --   12/02/22 0306 -- -- -- 97 (!) 25 98 % -- --   12/02/22 0305 -- -- -- 96 19 95 % -- --   12/02/22 0304 -- -- -- 94 16 97 % -- --   12/02/22 0303 -- -- -- 93 18 97 % -- --   12/02/22 0302 -- -- -- 100 (!) 21 97 % -- --   12/02/22 0301 -- -- -- (!) 102 19 98 % -- --   12/02/22 0300 118/61 -- -- 100 16 97 % -- --   12/02/22 0259 -- -- -- 98 (!) 23 99 % -- --   12/02/22 0258 -- -- -- 99 12 98 % -- --   12/02/22 0257 -- -- -- 95 17 98 % -- --   12/02/22 0256 -- -- -- (!) 104 (!) 22 95 % -- --   12/02/22 0255 -- -- -- 99 (!) 22 97 % -- --   12/02/22 0254 -- -- -- 94 16 97 % -- --   12/02/22 0253 -- -- -- 97 20 95 % -- --  "  12/02/22 0252 -- -- -- 98 16 97 % -- --   12/02/22 0251 -- -- -- 94 (!) 22 97 % -- --   12/02/22 0250 115/59 -- -- 97 14 98 % -- --   12/02/22 0249 115/59 -- -- 95 19 98 % -- --   12/02/22 0248 -- -- -- (!) 101 -- 97 % -- --   12/02/22 0247 -- -- -- 96 -- 98 % -- --   12/02/22 0246 -- -- -- 92 -- 98 % -- --   12/02/22 0245 -- -- -- 100 -- 97 % -- --   12/02/22 0244 -- -- -- 99 -- 97 % -- --   12/02/22 0243 -- -- -- (!) 116 -- 98 % -- --   12/02/22 0242 -- -- -- (!) 116 -- 98 % -- --   12/02/22 0241 -- -- -- 92 -- 98 % -- --   12/02/22 0240 -- -- -- 99 -- 98 % -- --   12/02/22 0239 -- -- -- 95 -- 97 % -- --   12/02/22 0238 -- -- -- 95 -- 97 % -- --   12/02/22 0237 -- -- -- (!) 102 -- 97 % 1.676 m (5' 6\") 104 kg (230 lb)   12/02/22 0232 122/63 36.9 °C (98.4 °F) Temporal 98 18 -- -- --   Well-developed well-nourished female no apparent distress  Abdomen soft nontender and gravid  Gynecologic exam deferred at this time      Recent Labs     12/01/22  1242 12/02/22 0236   WBC 14.8* 13.2*   RBC 2.62* 3.01*   HEMOGLOBIN 6.0* 7.3*   HEMATOCRIT 19.4* 23.2*   MCV 74.0* 77.1*   MCH 22.9* 24.3*   RDW 48.1 49.4   PLATELETCT 484* 450*   MPV 9.1 9.0   NEUTSPOLYS 80.40*  --    LYMPHOCYTES 11.80*  --    MONOCYTES 4.90  --    EOSINOPHILS 0.30  --    BASOPHILS 0.30  --      Other pertinent testing  Patient's Pap smear from first trimester was negative for intraepithelial lesion with a negative HPV    NIPT testing showed concerning findings for Tri ploidy    Ultrasound performed yesterday: No evidence of placenta previa, nabothian cysts in the cervix but otherwise normal-appearing cervix    Assessment and plan  32-year-old female with recurrent vaginal and uterine hemorrhage intrauterine gestation 16 weeks and 4 days  Discussed patient's management and current clinical situation with consulting physicians [facility] pregnancy [facility] who have seen her previously in consultation and in their office  Discussed with patient risks benefits " and management strategies  I reviewed with the patient that she continues to have vaginal bleeding without any known cause, likely this is related to the pregnancy as she had no vaginal bleeding of this nature prior to being pregnant  Perinatology states that recurrent vaginal bleeding with abnormal NIPT testing is very suspicious for molar pregnancy  The patient does have a amniocentesis set up for next week at [facility]  However, the patient continues to have severe and significant vaginal bleeding which continues to put her life at risk  I discussed with the patient consideration for termination of this pregnancy due to maternal risk at this time  I discussed mode of delivery with [facility] who would recommend Cytotec termination if we proceeded with this option  I also discussed with patient continued expectant management with follow-up at [facility] for amniocentesis if patient desires a definitive diagnosis for the baby  Consultant perinatologist however admits that undergoing amniocentesis may cause bleeding and fetal loss  At this point due to the significant risk to the maternal health and maternal life she is offered termination of pregnancy at this time  Patient is also offered continued continued expectant management  Risks benefits of all options were discussed she will discuss options with her  at this time  Repeat CBC

## 2022-12-02 NOTE — PROGRESS NOTES
Follow-up of conversation and management plan    Patient and  had time to discuss options for management  We have offered the patient induction of labor due to maternal indications  Patient has excepted induction of labor at this time    I reviewed with the patient options for management  Given the fact that she has had excessive vaginal bleeding we reviewed the risks associated with dilation and evacuation, I would be significantly concerned about instrumenting the patient's uterus and pregnancy as she has had frequent severe bleeding episodes  We discussed use of Cytotec for induction process, risks associated with Cytotec again would be bleeding once contractions started  I also discussed with the patient immediate hysterotomy for removal of the products of conception  Risks benefits of all these options were discussed at length  The patient at this point desires to start with Cytotec induction process in an attempt to hopefully avoid any type of surgery  I discussed with the patient that should she have any significant bleeding I would recommend hysterotomy.  If we are unable to control the bleeding via hysterotomy then we will proceed with hysterectomy as indicated as a lifesaving measure  Patient has already been consented for blood products, all blood products are on hold for patient at this time  Begin Cytotec augmentation.

## 2022-12-02 NOTE — PROGRESS NOTES
1200-Pt sent here by Dr Jauregui for low H/H. Orders received, Pt placed on toco monitor, FHT doppler at 145. Pt reports some cramping and bleeding still on and off with no change.   1245-IV started, labs drawn. Orders received  1403-Pt denies any questions regarding blood transfusion. Pt aware of US order and is hopeful she will get answer of why she is bleeding so much all the time. Pt remains positive and understanding.   1423- Pre transfusion meds given. Blood transfusion started.   1544-Second unit started. Pt denies any needs at this time .  1655-Blood transfusion completed. Dr Britton updated. MD will come to bedside.  1715-Dr Britton at bedside. POC dicussed. DIscharge order received  1736-Discharge instructions reviewed with pt. Pt states understanding and denies any questions.  1740-Pt discharged home with  .

## 2022-12-03 ENCOUNTER — ANESTHESIA (OUTPATIENT)
Dept: OBGYN | Facility: MEDICAL CENTER | Age: 32
DRG: 818 | End: 2022-12-03
Payer: MEDICAID

## 2022-12-03 ENCOUNTER — ANESTHESIA EVENT (OUTPATIENT)
Dept: OBGYN | Facility: MEDICAL CENTER | Age: 32
DRG: 818 | End: 2022-12-03
Payer: MEDICAID

## 2022-12-03 LAB
ALBUMIN SERPL BCP-MCNC: 2.9 G/DL (ref 3.2–4.9)
ALBUMIN SERPL BCP-MCNC: 3.1 G/DL (ref 3.2–4.9)
ALBUMIN SERPL BCP-MCNC: 3.2 G/DL (ref 3.2–4.9)
ALBUMIN/GLOB SERPL: 0.9 G/DL
ALBUMIN/GLOB SERPL: 1 G/DL
ALBUMIN/GLOB SERPL: 1.1 G/DL
ALP SERPL-CCNC: 100 U/L (ref 30–99)
ALP SERPL-CCNC: 94 U/L (ref 30–99)
ALP SERPL-CCNC: 97 U/L (ref 30–99)
ALT SERPL-CCNC: 81 U/L (ref 2–50)
ALT SERPL-CCNC: 82 U/L (ref 2–50)
ALT SERPL-CCNC: 87 U/L (ref 2–50)
ANION GAP SERPL CALC-SCNC: 11 MMOL/L (ref 7–16)
ANION GAP SERPL CALC-SCNC: 11 MMOL/L (ref 7–16)
ANION GAP SERPL CALC-SCNC: 14 MMOL/L (ref 7–16)
AST SERPL-CCNC: 64 U/L (ref 12–45)
AST SERPL-CCNC: 72 U/L (ref 12–45)
AST SERPL-CCNC: 76 U/L (ref 12–45)
B-HCG SERPL-ACNC: 9900 MIU/ML (ref 0–5)
BASOPHILS # BLD AUTO: 0.6 % (ref 0–1.8)
BASOPHILS # BLD: 0.16 K/UL (ref 0–0.12)
BILIRUB SERPL-MCNC: 0.5 MG/DL (ref 0.1–1.5)
BUN SERPL-MCNC: 7 MG/DL (ref 8–22)
BUN SERPL-MCNC: 7 MG/DL (ref 8–22)
BUN SERPL-MCNC: 8 MG/DL (ref 8–22)
CA-I SERPL-SCNC: 1.1 MMOL/L (ref 1.1–1.3)
CALCIUM SERPL-MCNC: 8.7 MG/DL (ref 8.5–10.5)
CALCIUM SERPL-MCNC: 8.7 MG/DL (ref 8.5–10.5)
CALCIUM SERPL-MCNC: 8.8 MG/DL (ref 8.5–10.5)
CHLORIDE SERPL-SCNC: 102 MMOL/L (ref 96–112)
CHLORIDE SERPL-SCNC: 103 MMOL/L (ref 96–112)
CHLORIDE SERPL-SCNC: 98 MMOL/L (ref 96–112)
CO2 SERPL-SCNC: 18 MMOL/L (ref 20–33)
CO2 SERPL-SCNC: 19 MMOL/L (ref 20–33)
CO2 SERPL-SCNC: 20 MMOL/L (ref 20–33)
CREAT SERPL-MCNC: 0.57 MG/DL (ref 0.5–1.4)
CREAT SERPL-MCNC: 0.61 MG/DL (ref 0.5–1.4)
CREAT SERPL-MCNC: 0.62 MG/DL (ref 0.5–1.4)
EOSINOPHIL # BLD AUTO: 0.05 K/UL (ref 0–0.51)
EOSINOPHIL NFR BLD: 0.2 % (ref 0–6.9)
ERYTHROCYTE [DISTWIDTH] IN BLOOD BY AUTOMATED COUNT: 50.4 FL (ref 35.9–50)
ERYTHROCYTE [DISTWIDTH] IN BLOOD BY AUTOMATED COUNT: 53.6 FL (ref 35.9–50)
ERYTHROCYTE [DISTWIDTH] IN BLOOD BY AUTOMATED COUNT: 53.7 FL (ref 35.9–50)
ERYTHROCYTE [DISTWIDTH] IN BLOOD BY AUTOMATED COUNT: 54.8 FL (ref 35.9–50)
FIBRINOGEN PPP-MCNC: 421 MG/DL (ref 215–460)
GFR SERPLBLD CREATININE-BSD FMLA CKD-EPI: 119 ML/MIN/1.73 M 2
GFR SERPLBLD CREATININE-BSD FMLA CKD-EPI: 120 ML/MIN/1.73 M 2
GFR SERPLBLD CREATININE-BSD FMLA CKD-EPI: 122 ML/MIN/1.73 M 2
GLOBULIN SER CALC-MCNC: 2.9 G/DL (ref 1.9–3.5)
GLOBULIN SER CALC-MCNC: 3.1 G/DL (ref 1.9–3.5)
GLOBULIN SER CALC-MCNC: 3.1 G/DL (ref 1.9–3.5)
GLUCOSE SERPL-MCNC: 101 MG/DL (ref 65–99)
GLUCOSE SERPL-MCNC: 74 MG/DL (ref 65–99)
GLUCOSE SERPL-MCNC: 84 MG/DL (ref 65–99)
HCT VFR BLD AUTO: 25.6 % (ref 37–47)
HCT VFR BLD AUTO: 26.1 % (ref 37–47)
HCT VFR BLD AUTO: 26.6 % (ref 37–47)
HCT VFR BLD AUTO: 27.4 % (ref 37–47)
HGB BLD-MCNC: 8.3 G/DL (ref 12–16)
HGB BLD-MCNC: 8.3 G/DL (ref 12–16)
HGB BLD-MCNC: 8.4 G/DL (ref 12–16)
HGB BLD-MCNC: 8.5 G/DL (ref 12–16)
IMM GRANULOCYTES # BLD AUTO: 1.06 K/UL (ref 0–0.11)
IMM GRANULOCYTES NFR BLD AUTO: 3.7 % (ref 0–0.9)
INR PPP: 1.08 (ref 0.87–1.13)
LYMPHOCYTES # BLD AUTO: 1.3 K/UL (ref 1–4.8)
LYMPHOCYTES NFR BLD: 4.6 % (ref 22–41)
MCH RBC QN AUTO: 25.4 PG (ref 27–33)
MCH RBC QN AUTO: 25.5 PG (ref 27–33)
MCHC RBC AUTO-ENTMCNC: 31 G/DL (ref 33.6–35)
MCHC RBC AUTO-ENTMCNC: 31.6 G/DL (ref 33.6–35)
MCHC RBC AUTO-ENTMCNC: 31.8 G/DL (ref 33.6–35)
MCHC RBC AUTO-ENTMCNC: 32.4 G/DL (ref 33.6–35)
MCV RBC AUTO: 78.3 FL (ref 81.4–97.8)
MCV RBC AUTO: 79.8 FL (ref 81.4–97.8)
MCV RBC AUTO: 80.9 FL (ref 81.4–97.8)
MCV RBC AUTO: 82 FL (ref 81.4–97.8)
MONOCYTES # BLD AUTO: 1.48 K/UL (ref 0–0.85)
MONOCYTES NFR BLD AUTO: 5.2 % (ref 0–13.4)
NEUTROPHILS # BLD AUTO: 24.52 K/UL (ref 2–7.15)
NEUTROPHILS NFR BLD: 85.7 % (ref 44–72)
NRBC # BLD AUTO: 0.31 K/UL
NRBC BLD-RTO: 1.1 /100 WBC
PLATELET # BLD AUTO: 314 K/UL (ref 164–446)
PLATELET # BLD AUTO: 331 K/UL (ref 164–446)
PLATELET # BLD AUTO: 352 K/UL (ref 164–446)
PLATELET # BLD AUTO: 357 K/UL (ref 164–446)
PMV BLD AUTO: 8.9 FL (ref 9–12.9)
PMV BLD AUTO: 9.1 FL (ref 9–12.9)
POTASSIUM SERPL-SCNC: 3.8 MMOL/L (ref 3.6–5.5)
POTASSIUM SERPL-SCNC: 3.9 MMOL/L (ref 3.6–5.5)
POTASSIUM SERPL-SCNC: 4.4 MMOL/L (ref 3.6–5.5)
PROT SERPL-MCNC: 6 G/DL (ref 6–8.2)
PROT SERPL-MCNC: 6 G/DL (ref 6–8.2)
PROT SERPL-MCNC: 6.3 G/DL (ref 6–8.2)
PROTHROMBIN TIME: 13.9 SEC (ref 12–14.6)
RBC # BLD AUTO: 3.27 M/UL (ref 4.2–5.4)
RBC # BLD AUTO: 3.27 M/UL (ref 4.2–5.4)
RBC # BLD AUTO: 3.29 M/UL (ref 4.2–5.4)
RBC # BLD AUTO: 3.34 M/UL (ref 4.2–5.4)
SODIUM SERPL-SCNC: 131 MMOL/L (ref 135–145)
SODIUM SERPL-SCNC: 132 MMOL/L (ref 135–145)
SODIUM SERPL-SCNC: 133 MMOL/L (ref 135–145)
WBC # BLD AUTO: 20.3 K/UL (ref 4.8–10.8)
WBC # BLD AUTO: 21 K/UL (ref 4.8–10.8)
WBC # BLD AUTO: 27.9 K/UL (ref 4.8–10.8)
WBC # BLD AUTO: 28.6 K/UL (ref 4.8–10.8)

## 2022-12-03 PROCEDURE — 88305 TISSUE EXAM BY PATHOLOGIST: CPT | Mod: 59

## 2022-12-03 PROCEDURE — 700102 HCHG RX REV CODE 250 W/ 637 OVERRIDE(OP): Performed by: ANESTHESIOLOGY

## 2022-12-03 PROCEDURE — A9270 NON-COVERED ITEM OR SERVICE: HCPCS | Performed by: OBSTETRICS & GYNECOLOGY

## 2022-12-03 PROCEDURE — 700105 HCHG RX REV CODE 258: Performed by: ANESTHESIOLOGY

## 2022-12-03 PROCEDURE — 80053 COMPREHEN METABOLIC PANEL: CPT | Mod: 91

## 2022-12-03 PROCEDURE — 86923 COMPATIBILITY TEST ELECTRIC: CPT

## 2022-12-03 PROCEDURE — 10A07ZZ ABORTION OF PRODUCTS OF CONCEPTION, VIA NATURAL OR ARTIFICIAL OPENING: ICD-10-PCS | Performed by: OBSTETRICS & GYNECOLOGY

## 2022-12-03 PROCEDURE — A9270 NON-COVERED ITEM OR SERVICE: HCPCS | Performed by: ANESTHESIOLOGY

## 2022-12-03 PROCEDURE — 700102 HCHG RX REV CODE 250 W/ 637 OVERRIDE(OP): Performed by: OBSTETRICS & GYNECOLOGY

## 2022-12-03 PROCEDURE — 160048 HCHG OR STATISTICAL LEVEL 1-5: Performed by: OBSTETRICS & GYNECOLOGY

## 2022-12-03 PROCEDURE — 770002 HCHG ROOM/CARE - OB PRIVATE (112)

## 2022-12-03 PROCEDURE — 88300 SURGICAL PATH GROSS: CPT

## 2022-12-03 PROCEDURE — 700102 HCHG RX REV CODE 250 W/ 637 OVERRIDE(OP)

## 2022-12-03 PROCEDURE — 01966 ANES INDUCED ABORTION PX: CPT | Performed by: ANESTHESIOLOGY

## 2022-12-03 PROCEDURE — A9270 NON-COVERED ITEM OR SERVICE: HCPCS

## 2022-12-03 PROCEDURE — 36415 COLL VENOUS BLD VENIPUNCTURE: CPT

## 2022-12-03 PROCEDURE — 700111 HCHG RX REV CODE 636 W/ 250 OVERRIDE (IP)

## 2022-12-03 PROCEDURE — 700105 HCHG RX REV CODE 258: Performed by: OBSTETRICS & GYNECOLOGY

## 2022-12-03 PROCEDURE — 84702 CHORIONIC GONADOTROPIN TEST: CPT

## 2022-12-03 PROCEDURE — 700111 HCHG RX REV CODE 636 W/ 250 OVERRIDE (IP): Performed by: ANESTHESIOLOGY

## 2022-12-03 PROCEDURE — 160002 HCHG RECOVERY MINUTES (STAT): Performed by: OBSTETRICS & GYNECOLOGY

## 2022-12-03 PROCEDURE — 160009 HCHG ANES TIME/MIN: Performed by: OBSTETRICS & GYNECOLOGY

## 2022-12-03 PROCEDURE — 85027 COMPLETE CBC AUTOMATED: CPT | Mod: 91

## 2022-12-03 PROCEDURE — 160035 HCHG PACU - 1ST 60 MINS PHASE I: Performed by: OBSTETRICS & GYNECOLOGY

## 2022-12-03 PROCEDURE — 700111 HCHG RX REV CODE 636 W/ 250 OVERRIDE (IP): Performed by: OBSTETRICS & GYNECOLOGY

## 2022-12-03 PROCEDURE — 85025 COMPLETE CBC W/AUTO DIFF WBC: CPT

## 2022-12-03 PROCEDURE — 160029 HCHG SURGERY MINUTES - 1ST 30 MINS LEVEL 4: Performed by: OBSTETRICS & GYNECOLOGY

## 2022-12-03 PROCEDURE — 700101 HCHG RX REV CODE 250: Performed by: ANESTHESIOLOGY

## 2022-12-03 PROCEDURE — 160041 HCHG SURGERY MINUTES - EA ADDL 1 MIN LEVEL 4: Performed by: OBSTETRICS & GYNECOLOGY

## 2022-12-03 PROCEDURE — 85610 PROTHROMBIN TIME: CPT

## 2022-12-03 PROCEDURE — 85384 FIBRINOGEN ACTIVITY: CPT

## 2022-12-03 PROCEDURE — 82330 ASSAY OF CALCIUM: CPT

## 2022-12-03 RX ORDER — ONDANSETRON 2 MG/ML
4 INJECTION INTRAMUSCULAR; INTRAVENOUS
Status: DISCONTINUED | OUTPATIENT
Start: 2022-12-03 | End: 2022-12-03 | Stop reason: HOSPADM

## 2022-12-03 RX ORDER — HYDROMORPHONE HYDROCHLORIDE 1 MG/ML
0.2 INJECTION, SOLUTION INTRAMUSCULAR; INTRAVENOUS; SUBCUTANEOUS
Status: DISCONTINUED | OUTPATIENT
Start: 2022-12-03 | End: 2022-12-03 | Stop reason: HOSPADM

## 2022-12-03 RX ORDER — MISOPROSTOL 200 UG/1
TABLET ORAL
Status: COMPLETED
Start: 2022-12-03 | End: 2022-12-03

## 2022-12-03 RX ORDER — MISOPROSTOL 200 UG/1
400 TABLET ORAL EVERY 4 HOURS
Status: DISCONTINUED | OUTPATIENT
Start: 2022-12-03 | End: 2022-12-03

## 2022-12-03 RX ORDER — MISOPROSTOL 200 UG/1
400 TABLET ORAL 4 TIMES DAILY
Status: DISCONTINUED | OUTPATIENT
Start: 2022-12-03 | End: 2022-12-03

## 2022-12-03 RX ORDER — MISOPROSTOL 100 UG/1
TABLET ORAL PRN
Status: DISCONTINUED | OUTPATIENT
Start: 2022-12-03 | End: 2022-12-03 | Stop reason: SURG

## 2022-12-03 RX ORDER — TRANEXAMIC ACID 100 MG/ML
INJECTION, SOLUTION INTRAVENOUS PRN
Status: DISCONTINUED | OUTPATIENT
Start: 2022-12-03 | End: 2022-12-03 | Stop reason: SURG

## 2022-12-03 RX ORDER — MIDAZOLAM HYDROCHLORIDE 1 MG/ML
1 INJECTION INTRAMUSCULAR; INTRAVENOUS
Status: DISCONTINUED | OUTPATIENT
Start: 2022-12-03 | End: 2022-12-03 | Stop reason: HOSPADM

## 2022-12-03 RX ORDER — OXYCODONE HCL 5 MG/5 ML
10 SOLUTION, ORAL ORAL
Status: COMPLETED | OUTPATIENT
Start: 2022-12-03 | End: 2022-12-03

## 2022-12-03 RX ORDER — DEXAMETHASONE SODIUM PHOSPHATE 4 MG/ML
INJECTION, SOLUTION INTRA-ARTICULAR; INTRALESIONAL; INTRAMUSCULAR; INTRAVENOUS; SOFT TISSUE PRN
Status: DISCONTINUED | OUTPATIENT
Start: 2022-12-03 | End: 2022-12-03 | Stop reason: SURG

## 2022-12-03 RX ORDER — ONDANSETRON 2 MG/ML
INJECTION INTRAMUSCULAR; INTRAVENOUS
Status: COMPLETED
Start: 2022-12-03 | End: 2022-12-03

## 2022-12-03 RX ORDER — SODIUM CHLORIDE, SODIUM LACTATE, POTASSIUM CHLORIDE, CALCIUM CHLORIDE 600; 310; 30; 20 MG/100ML; MG/100ML; MG/100ML; MG/100ML
INJECTION, SOLUTION INTRAVENOUS CONTINUOUS
Status: DISCONTINUED | OUTPATIENT
Start: 2022-12-03 | End: 2022-12-05 | Stop reason: HOSPADM

## 2022-12-03 RX ORDER — OXYCODONE HCL 5 MG/5 ML
5 SOLUTION, ORAL ORAL
Status: COMPLETED | OUTPATIENT
Start: 2022-12-03 | End: 2022-12-03

## 2022-12-03 RX ORDER — HALOPERIDOL 5 MG/ML
1 INJECTION INTRAMUSCULAR
Status: DISCONTINUED | OUTPATIENT
Start: 2022-12-03 | End: 2022-12-03 | Stop reason: HOSPADM

## 2022-12-03 RX ORDER — DIPHENHYDRAMINE HYDROCHLORIDE 50 MG/ML
12.5 INJECTION INTRAMUSCULAR; INTRAVENOUS
Status: DISCONTINUED | OUTPATIENT
Start: 2022-12-03 | End: 2022-12-03 | Stop reason: HOSPADM

## 2022-12-03 RX ORDER — ONDANSETRON 2 MG/ML
4 INJECTION INTRAMUSCULAR; INTRAVENOUS EVERY 4 HOURS PRN
Status: DISCONTINUED | OUTPATIENT
Start: 2022-12-03 | End: 2022-12-05 | Stop reason: HOSPADM

## 2022-12-03 RX ORDER — SODIUM CHLORIDE, SODIUM GLUCONATE, SODIUM ACETATE, POTASSIUM CHLORIDE AND MAGNESIUM CHLORIDE 526; 502; 368; 37; 30 MG/100ML; MG/100ML; MG/100ML; MG/100ML; MG/100ML
INJECTION, SOLUTION INTRAVENOUS
Status: DISCONTINUED | OUTPATIENT
Start: 2022-12-03 | End: 2022-12-03 | Stop reason: SURG

## 2022-12-03 RX ORDER — ONDANSETRON 2 MG/ML
INJECTION INTRAMUSCULAR; INTRAVENOUS PRN
Status: DISCONTINUED | OUTPATIENT
Start: 2022-12-03 | End: 2022-12-03 | Stop reason: SURG

## 2022-12-03 RX ORDER — CEFAZOLIN SODIUM 1 G/3ML
INJECTION, POWDER, FOR SOLUTION INTRAMUSCULAR; INTRAVENOUS PRN
Status: DISCONTINUED | OUTPATIENT
Start: 2022-12-03 | End: 2022-12-03 | Stop reason: SURG

## 2022-12-03 RX ORDER — MISOPROSTOL 200 UG/1
400 TABLET ORAL ONCE
Status: COMPLETED | OUTPATIENT
Start: 2022-12-03 | End: 2022-12-03

## 2022-12-03 RX ORDER — LIDOCAINE HYDROCHLORIDE 20 MG/ML
INJECTION, SOLUTION EPIDURAL; INFILTRATION; INTRACAUDAL; PERINEURAL PRN
Status: DISCONTINUED | OUTPATIENT
Start: 2022-12-03 | End: 2022-12-03 | Stop reason: SURG

## 2022-12-03 RX ORDER — HYDROMORPHONE HYDROCHLORIDE 1 MG/ML
0.1 INJECTION, SOLUTION INTRAMUSCULAR; INTRAVENOUS; SUBCUTANEOUS
Status: DISCONTINUED | OUTPATIENT
Start: 2022-12-03 | End: 2022-12-03 | Stop reason: HOSPADM

## 2022-12-03 RX ORDER — ACETAMINOPHEN 500 MG
1000 TABLET ORAL EVERY 4 HOURS PRN
Status: DISCONTINUED | OUTPATIENT
Start: 2022-12-03 | End: 2022-12-05 | Stop reason: HOSPADM

## 2022-12-03 RX ORDER — MEPERIDINE HYDROCHLORIDE 25 MG/ML
12.5 INJECTION INTRAMUSCULAR; INTRAVENOUS; SUBCUTANEOUS
Status: DISCONTINUED | OUTPATIENT
Start: 2022-12-03 | End: 2022-12-03 | Stop reason: HOSPADM

## 2022-12-03 RX ORDER — IBUPROFEN 800 MG/1
800 TABLET ORAL EVERY 6 HOURS PRN
Status: DISCONTINUED | OUTPATIENT
Start: 2022-12-03 | End: 2022-12-05 | Stop reason: HOSPADM

## 2022-12-03 RX ORDER — HYDROMORPHONE HYDROCHLORIDE 1 MG/ML
0.4 INJECTION, SOLUTION INTRAMUSCULAR; INTRAVENOUS; SUBCUTANEOUS
Status: DISCONTINUED | OUTPATIENT
Start: 2022-12-03 | End: 2022-12-03 | Stop reason: HOSPADM

## 2022-12-03 RX ADMIN — FENTANYL CITRATE 100 MCG: 50 INJECTION, SOLUTION INTRAMUSCULAR; INTRAVENOUS at 13:35

## 2022-12-03 RX ADMIN — FENTANYL CITRATE 100 MCG: 50 INJECTION, SOLUTION INTRAMUSCULAR; INTRAVENOUS at 09:04

## 2022-12-03 RX ADMIN — FENTANYL CITRATE 100 MCG: 50 INJECTION, SOLUTION INTRAMUSCULAR; INTRAVENOUS at 03:44

## 2022-12-03 RX ADMIN — LIDOCAINE HYDROCHLORIDE 50 MG: 20 INJECTION, SOLUTION EPIDURAL; INFILTRATION; INTRACAUDAL at 15:46

## 2022-12-03 RX ADMIN — ONDANSETRON 4 MG: 2 INJECTION INTRAMUSCULAR; INTRAVENOUS at 00:39

## 2022-12-03 RX ADMIN — ONDANSETRON 4 MG: 2 INJECTION INTRAMUSCULAR; INTRAVENOUS at 19:30

## 2022-12-03 RX ADMIN — HYDROMORPHONE HYDROCHLORIDE 0.2 MG: 1 INJECTION, SOLUTION INTRAMUSCULAR; INTRAVENOUS; SUBCUTANEOUS at 17:08

## 2022-12-03 RX ADMIN — SODIUM CHLORIDE, SODIUM GLUCONATE, SODIUM ACETATE, POTASSIUM CHLORIDE AND MAGNESIUM CHLORIDE: 526; 502; 368; 37; 30 INJECTION, SOLUTION INTRAVENOUS at 15:39

## 2022-12-03 RX ADMIN — ONDANSETRON 4 MG: 2 INJECTION INTRAMUSCULAR; INTRAVENOUS at 17:09

## 2022-12-03 RX ADMIN — ALPRAZOLAM 0.25 MG: 0.25 TABLET ORAL at 21:51

## 2022-12-03 RX ADMIN — ONDANSETRON 4 MG: 2 INJECTION INTRAMUSCULAR; INTRAVENOUS at 12:57

## 2022-12-03 RX ADMIN — MISOPROSTOL 400 MCG: 200 TABLET ORAL at 00:15

## 2022-12-03 RX ADMIN — TRANEXAMIC ACID 1000 MG: 100 INJECTION, SOLUTION INTRAVENOUS at 16:04

## 2022-12-03 RX ADMIN — IBUPROFEN 800 MG: 800 TABLET, FILM COATED ORAL at 21:51

## 2022-12-03 RX ADMIN — MISOPROSTOL 400 MCG: 200 TABLET ORAL at 09:13

## 2022-12-03 RX ADMIN — CEFAZOLIN 100 MG: 330 INJECTION, POWDER, FOR SOLUTION INTRAMUSCULAR; INTRAVENOUS at 15:51

## 2022-12-03 RX ADMIN — DEXAMETHASONE SODIUM PHOSPHATE 4 MG: 4 INJECTION, SOLUTION INTRA-ARTICULAR; INTRALESIONAL; INTRAMUSCULAR; INTRAVENOUS; SOFT TISSUE at 16:09

## 2022-12-03 RX ADMIN — ONDANSETRON 4 MG: 2 INJECTION INTRAMUSCULAR; INTRAVENOUS at 16:14

## 2022-12-03 RX ADMIN — PROPOFOL 170 MG: 10 INJECTION, EMULSION INTRAVENOUS at 15:46

## 2022-12-03 RX ADMIN — FENTANYL CITRATE 100 MCG: 50 INJECTION, SOLUTION INTRAMUSCULAR; INTRAVENOUS at 20:50

## 2022-12-03 RX ADMIN — OXYCODONE HYDROCHLORIDE 10 MG: 5 SOLUTION ORAL at 17:04

## 2022-12-03 RX ADMIN — METHYLERGONOVINE MALEATE 0.2 MG: 0.2 INJECTION, SOLUTION INTRAMUSCULAR; INTRAVENOUS at 16:02

## 2022-12-03 RX ADMIN — MISOPROSTOL 400 MCG: 200 TABLET ORAL at 04:18

## 2022-12-03 RX ADMIN — FENTANYL CITRATE 50 MCG: 50 INJECTION, SOLUTION INTRAMUSCULAR; INTRAVENOUS at 15:59

## 2022-12-03 RX ADMIN — SODIUM CHLORIDE, POTASSIUM CHLORIDE, SODIUM LACTATE AND CALCIUM CHLORIDE: 600; 310; 30; 20 INJECTION, SOLUTION INTRAVENOUS at 05:09

## 2022-12-03 RX ADMIN — MISOPROSTOL 800 MCG: 100 TABLET ORAL at 16:05

## 2022-12-03 RX ADMIN — FENTANYL CITRATE 100 MCG: 50 INJECTION, SOLUTION INTRAMUSCULAR; INTRAVENOUS at 18:14

## 2022-12-03 RX ADMIN — FENTANYL CITRATE 50 MCG: 50 INJECTION, SOLUTION INTRAMUSCULAR; INTRAVENOUS at 16:14

## 2022-12-03 ASSESSMENT — LIFESTYLE VARIABLES
ORIENTATION AND CLOUDING OF SENSORIUM: ORIENTED AND CAN DO SERIAL ADDITIONS
AUDITORY DISTURBANCES: NOT PRESENT
EVER FELT BAD OR GUILTY ABOUT YOUR DRINKING: NO
HAVE YOU EVER FELT YOU SHOULD CUT DOWN ON YOUR DRINKING: NO
DOES PATIENT WANT TO STOP DRINKING: NO
CONSUMPTION TOTAL: INCOMPLETE
PAROXYSMAL SWEATS: NO SWEAT VISIBLE
HEADACHE, FULLNESS IN HEAD: NOT PRESENT
AGITATION: NORMAL ACTIVITY
HAVE PEOPLE ANNOYED YOU BY CRITICIZING YOUR DRINKING: NO
DO YOU DRINK ALCOHOL: NO
NAUSEA AND VOMITING: NO NAUSEA AND NO VOMITING
TOTAL SCORE: 0
ANXIETY: MILDLY ANXIOUS
EVER HAD A DRINK FIRST THING IN THE MORNING TO STEADY YOUR NERVES TO GET RID OF A HANGOVER: NO
TOTAL SCORE: 0
TREMOR: NO TREMOR
TOTAL SCORE: 1
VISUAL DISTURBANCES: NOT PRESENT
TOTAL SCORE: 0

## 2022-12-03 ASSESSMENT — PAIN DESCRIPTION - PAIN TYPE: TYPE: ACUTE PAIN

## 2022-12-03 ASSESSMENT — PAIN SCALES - GENERAL: PAIN_LEVEL: 2

## 2022-12-03 NOTE — PROGRESS NOTES
1400 Report received from Miladis DUNN RN at bedside and assumed care. POC discussed with pt and s/o and encouraged to state needs or questions at any time.     1500 Dr. Jauregui at bedside, POC discussed with pt and s/o.    1645 Pt called out for bleeding episode. Pt was in the bathroom and started heavily bleeding. Another RN went to bedside and assisted pt back to bed and called Dr. Jauregui. Provider on her way from main OR. This RN to bedside several minutes later. Approximately 50ml of blood on pad beneath patient. Pt states she feels a clot still inside and would like to stand up to make it come out and also needs to void. Patient assisted to bathroom, approximately softball-sized clot in the toilet. Pt voided and assisted back to bed, Dr. Jauregui at bedside by this time. SVE by provider FT/thick/high.    2100 Pt calls out that part of medication fell out. This RN to bedside, half of 1 cytotec pill (100mcg) noted on napkin.    2315 Dr. Jauregui at bedside, POC discussed with pt. Orders modified.    0050 Pt reports vaginal pressure. SVE by this RN 1/50/tasha    0150 Report given to Maria Del Rosario LYONS RN at bedside

## 2022-12-03 NOTE — PROGRESS NOTES
"Called to bedside to evaluate patient for bleeding    S: Patient states that she passed 2 or 3 large clots of blood.  She states this is not different from the bleeding that she has had recently, she states this is very common for her she is starting to feel some cramps at this point.    O:/59   Pulse 94   Temp 37 °C (98.6 °F) (Temporal)   Resp 18   Ht 1.676 m (5' 6\")   Wt 104 kg (230 lb 2.6 oz)   LMP 08/08/2022 (Exact Date)   SpO2 96%   BMI 37.15 kg/m²    Sterile speculum exam was performed  Normal external female genitalia  Small amount of blood within the vaginal vault, unable to assess the cervix secondary to redundancy of vaginal tissue  Cervical exam fingertip thick, high presentation  Uterus slightly tender    Assessment and plan  IUP 16 weeks 4 days undergoing induction of labor, termination for maternal indications  Has already had Cytotec 400 mg placed, at this point no indication for intervention, surgical but will continue to monitor.  "

## 2022-12-03 NOTE — ANESTHESIA PREPROCEDURE EVALUATION
Case: [id number] Date/Time: 12/03/22 1430    Procedure: DILATION AND EVACUATION, UTERUS (Vagina )    Pre-op diagnosis: Failure to progress    Location: [facility] OR 03 / [facility]ERY [facility] DELIVERY    Surgeons: Sue Hardin M.D.          Relevant Problems   No relevant active problems       Physical Exam    Airway   Mallampati: II  TM distance: >3 FB  Neck ROM: full       Cardiovascular - normal exam  Rhythm: regular  Rate: normal  (-) murmur     Dental - normal exam           Pulmonary - normal exam  Breath sounds clear to auscultation     Abdominal    Neurological - normal exam                 Anesthesia Plan    ASA 2       Plan - general       Airway plan will be LMA          Induction: intravenous    Postoperative Plan: Postoperative administration of opioids is intended.    Pertinent diagnostic labs and testing reviewed    Informed Consent:    Anesthetic plan and risks discussed with patient.    Use of blood products discussed with: patient whom consented to blood products.

## 2022-12-03 NOTE — PROGRESS NOTES
OB Progress Note:    Assuming care. Pt with small continued bleeding, nothing heavy.  Some mild cramping.       137/64, Pulse 80-90s  SVE: 1-2/30/-4     Latest Reference Range & Units 22 08:00   Sodium 135 - 145 mmol/L 133 (L)   Potassium 3.6 - 5.5 mmol/L 3.8   Chloride 96 - 112 mmol/L 103   Co2 20 - 33 mmol/L 19 (L)   Anion Gap 7.0 - 16.0  11.0   Glucose 65 - 99 mg/dL 84   Bun 8 - 22 mg/dL 7 (L)   Creatinine 0.50 - 1.40 mg/dL 0.61   GFR (CKD-EPI) >60 mL/min/1.73 m 2 120   Calcium 8.5 - 10.5 mg/dL 8.7   AST(SGOT) 12 - 45 U/L 64 (H)   ALT(SGPT) 2 - 50 U/L 81 (H)   Alkaline Phosphatase 30 - 99 U/L 94   Total Bilirubin 0.1 - 1.5 mg/dL 0.5   Albumin 3.2 - 4.9 g/dL 3.1 (L)   Total Protein 6.0 - 8.2 g/dL 6.0   Globulin 1.9 - 3.5 g/dL 2.9   A-G Ratio g/dL 1.1   Ionized Calcium 1.1 - 1.3 mmol/L 1.1   (L): Data is abnormally low  (H): Data is abnormally high     Latest Reference Range & Units 22 08:00   WBC 4.8 - 10.8 K/uL 20.3 (H)   RBC 4.20 - 5.40 M/uL 3.27 (L)   Hemoglobin 12.0 - 16.0 g/dL 8.3 (L)   Hematocrit 37.0 - 47.0 % 25.6 (L)   MCV 81.4 - 97.8 fL 78.3 (L)   MCH 27.0 - 33.0 pg 25.4 (L)   MCHC 33.6 - 35.0 g/dL 32.4 (L)   RDW 35.9 - 50.0 fL 50.4 (H)   Platelet Count 164 - 446 K/uL 352   (H): Data is abnormally high  (L): Data is abnormally low     Latest Reference Range & Units 22 08:00   PT 12.0 - 14.6 sec 13.9   INR 0.87 - 1.13  1.08   Fibrinogen 215 - 460 mg/dL 421       A/P: 33yo  @ 16w5d undergoing termination for vaginal hemorrhage in pregnancy.     Discussed option of additional dose of cytotec vs proceeding with D&E now.  Discussed that even after delivery of fetus there's a good chance she will need a D&C for removal of placenta.  She is at high risk of needing additional blood products around delivery, hgb relatively stable from yesterday, coags wnl.  Discussed possibility of partial molar pregnancy although wouldn't know for sure utnil pathology returns.  Will get hCG with next set  of labs.  LFTs increased slightly, unclear etiology at this time.  Will repeat labs in 4hrs to see if trending.  Pt does desire additional children in the future.  Discussed if with acute hemorrhage after removal of pregnancy tissue would try balloon placement but if cannot control hemorrhage there is a possibility we would need to proceed with hysterectomy.  Pt expressed understanding.  Has blood on hold, 2 IV's in place.      Sue Hardin MD  [facility]      1320 Addendum:  Pt still with cramping.     SVE unchanged, approximately 2cm, thick.    - pt desires to proceed with D&E now.  I discussed w/ pt risks of surgery including pain, bleeding, infection, risk of uterine perforation and damage to surrounding structures including bowel/bladder/ureters/nerves/blood vessels.  Discussed risk of additional interventions, up to and including hysterectomy.   All questions answered.      - will have blood in room, repeat CBC pending  - Dr. Ramsey from [facility] to come in for US guidance during procedure    Sue Hardin MD  [facility]

## 2022-12-03 NOTE — PROGRESS NOTES
32 y.o.  @ 16.5 undergoing termination for maternal hemorrhage in pregnancy. This is a desired pregnancy. Pt in room 226 with FOB Kendell at bedside. Pt has been seen through out this pregnancy with vaginal bleeding and has had multiple blood transfusions. Hx of previous partial molar pregnancy. This pregnancy abnormal NILPT screen.     0900 Dr. Sosa at bedside. SVE 1-2 cm  Pt counseled on risks and benefits of continuing to try to induce vaginal delivery versus D&E.   Pt requests additional cytotec placement, then will reconsider @ 1300.   800 mcg Cytotec placed vaginally by Dr. Sosa.   Fentanyl given per pt request    1300 Dr. Sosa at bedside. Repeat SVE, no change from previous exam. Pt counseled that D&E is recommended at this time. Pt agreed and consented.   2 units blood on hold in OR.     1538 to OR. General anesthesia by Dr. Saldana. LMA in place.   Dr. Ramsey in room to assist with Ultrasound.   Procedure start 1557  Products of conception evacuated  1603 Methergine given  1603 TXA given by Les  800 mcg placed pr by Dr. Sosa    EBL 500ml  1620 Out  1622 PACU IN  CBC ordered and drawn  Pain medication given, see MAR.     Will send tissue sample for chromosomal studies.   CBC results called to Dr. Sosa HGB 8.4 HCT 26.6. Per Dr. Sosa RBC's may be returned to Blood bank.

## 2022-12-03 NOTE — ANESTHESIA PROCEDURE NOTES
Airway    Date/Time: 12/3/2022 3:47 PM  Performed by: Franki Saldana M.D.  Authorized by: Franki Saldana M.D.     Location:  OR  Urgency:  Elective  Difficult Airway: No    Indications for Airway Management:  Anesthesia      Spontaneous Ventilation: absent    Sedation Level:  Deep  Preoxygenated: Yes    Final Airway Type:  Supraglottic airway  Final Supraglottic Airway:  Standard LMA    SGA Size:  3  Number of Attempts at Approach:  1  Number of Other Approaches Attempted:  0

## 2022-12-04 LAB
ALBUMIN SERPL BCP-MCNC: 3 G/DL (ref 3.2–4.9)
ALBUMIN SERPL BCP-MCNC: 3.1 G/DL (ref 3.2–4.9)
ALBUMIN/GLOB SERPL: 1.1 G/DL
ALBUMIN/GLOB SERPL: 1.2 G/DL
ALP SERPL-CCNC: 84 U/L (ref 30–99)
ALP SERPL-CCNC: 89 U/L (ref 30–99)
ALT SERPL-CCNC: 118 U/L (ref 2–50)
ALT SERPL-CCNC: 90 U/L (ref 2–50)
ANION GAP SERPL CALC-SCNC: 10 MMOL/L (ref 7–16)
ANION GAP SERPL CALC-SCNC: 11 MMOL/L (ref 7–16)
AST SERPL-CCNC: 112 U/L (ref 12–45)
AST SERPL-CCNC: 74 U/L (ref 12–45)
BILIRUB SERPL-MCNC: 0.3 MG/DL (ref 0.1–1.5)
BILIRUB SERPL-MCNC: 0.3 MG/DL (ref 0.1–1.5)
BUN SERPL-MCNC: 12 MG/DL (ref 8–22)
BUN SERPL-MCNC: 13 MG/DL (ref 8–22)
CALCIUM SERPL-MCNC: 8.7 MG/DL (ref 8.5–10.5)
CALCIUM SERPL-MCNC: 9.1 MG/DL (ref 8.5–10.5)
CHLORIDE SERPL-SCNC: 101 MMOL/L (ref 96–112)
CHLORIDE SERPL-SCNC: 102 MMOL/L (ref 96–112)
CO2 SERPL-SCNC: 20 MMOL/L (ref 20–33)
CO2 SERPL-SCNC: 21 MMOL/L (ref 20–33)
CREAT SERPL-MCNC: 0.64 MG/DL (ref 0.5–1.4)
CREAT SERPL-MCNC: 0.64 MG/DL (ref 0.5–1.4)
ERYTHROCYTE [DISTWIDTH] IN BLOOD BY AUTOMATED COUNT: 54.4 FL (ref 35.9–50)
ERYTHROCYTE [DISTWIDTH] IN BLOOD BY AUTOMATED COUNT: 54.6 FL (ref 35.9–50)
GFR SERPLBLD CREATININE-BSD FMLA CKD-EPI: 118 ML/MIN/1.73 M 2
GFR SERPLBLD CREATININE-BSD FMLA CKD-EPI: 118 ML/MIN/1.73 M 2
GLOBULIN SER CALC-MCNC: 2.6 G/DL (ref 1.9–3.5)
GLOBULIN SER CALC-MCNC: 2.9 G/DL (ref 1.9–3.5)
GLUCOSE SERPL-MCNC: 114 MG/DL (ref 65–99)
GLUCOSE SERPL-MCNC: 118 MG/DL (ref 65–99)
HCT VFR BLD AUTO: 22.6 % (ref 37–47)
HCT VFR BLD AUTO: 23.6 % (ref 37–47)
HGB BLD-MCNC: 7.3 G/DL (ref 12–16)
HGB BLD-MCNC: 7.3 G/DL (ref 12–16)
MCH RBC QN AUTO: 25.4 PG (ref 27–33)
MCH RBC QN AUTO: 26.2 PG (ref 27–33)
MCHC RBC AUTO-ENTMCNC: 30.9 G/DL (ref 33.6–35)
MCHC RBC AUTO-ENTMCNC: 32.3 G/DL (ref 33.6–35)
MCV RBC AUTO: 81 FL (ref 81.4–97.8)
MCV RBC AUTO: 82.2 FL (ref 81.4–97.8)
PLATELET # BLD AUTO: 305 K/UL (ref 164–446)
PLATELET # BLD AUTO: 317 K/UL (ref 164–446)
PMV BLD AUTO: 9.2 FL (ref 9–12.9)
PMV BLD AUTO: 9.3 FL (ref 9–12.9)
POTASSIUM SERPL-SCNC: 3.6 MMOL/L (ref 3.6–5.5)
POTASSIUM SERPL-SCNC: 3.8 MMOL/L (ref 3.6–5.5)
PROT SERPL-MCNC: 5.6 G/DL (ref 6–8.2)
PROT SERPL-MCNC: 6 G/DL (ref 6–8.2)
RBC # BLD AUTO: 2.79 M/UL (ref 4.2–5.4)
RBC # BLD AUTO: 2.87 M/UL (ref 4.2–5.4)
SODIUM SERPL-SCNC: 132 MMOL/L (ref 135–145)
SODIUM SERPL-SCNC: 133 MMOL/L (ref 135–145)
WBC # BLD AUTO: 15.3 K/UL (ref 4.8–10.8)
WBC # BLD AUTO: 21.2 K/UL (ref 4.8–10.8)

## 2022-12-04 PROCEDURE — A9270 NON-COVERED ITEM OR SERVICE: HCPCS | Performed by: OBSTETRICS & GYNECOLOGY

## 2022-12-04 PROCEDURE — 86923 COMPATIBILITY TEST ELECTRIC: CPT

## 2022-12-04 PROCEDURE — 88233 TISSUE CULTURE SKIN/BIOPSY: CPT

## 2022-12-04 PROCEDURE — P9016 RBC LEUKOCYTES REDUCED: HCPCS | Mod: 91

## 2022-12-04 PROCEDURE — 700102 HCHG RX REV CODE 250 W/ 637 OVERRIDE(OP): Performed by: OBSTETRICS & GYNECOLOGY

## 2022-12-04 PROCEDURE — 80053 COMPREHEN METABOLIC PANEL: CPT | Mod: 91

## 2022-12-04 PROCEDURE — 85027 COMPLETE CBC AUTOMATED: CPT

## 2022-12-04 PROCEDURE — 700111 HCHG RX REV CODE 636 W/ 250 OVERRIDE (IP): Performed by: OBSTETRICS & GYNECOLOGY

## 2022-12-04 PROCEDURE — 770002 HCHG ROOM/CARE - OB PRIVATE (112)

## 2022-12-04 PROCEDURE — 88262 CHROMOSOME ANALYSIS 15-20: CPT

## 2022-12-04 PROCEDURE — 36415 COLL VENOUS BLD VENIPUNCTURE: CPT

## 2022-12-04 PROCEDURE — 36430 TRANSFUSION BLD/BLD COMPNT: CPT

## 2022-12-04 RX ORDER — ALPRAZOLAM 0.25 MG/1
0.25 TABLET ORAL ONCE
Status: COMPLETED | OUTPATIENT
Start: 2022-12-04 | End: 2022-12-04

## 2022-12-04 RX ORDER — DIPHENHYDRAMINE HCL 25 MG
50 TABLET ORAL ONCE
Status: COMPLETED | OUTPATIENT
Start: 2022-12-04 | End: 2022-12-04

## 2022-12-04 RX ADMIN — DIPHENHYDRAMINE HYDROCHLORIDE 50 MG: 25 TABLET ORAL at 19:45

## 2022-12-04 RX ADMIN — ALPRAZOLAM 0.25 MG: 0.25 TABLET ORAL at 22:33

## 2022-12-04 RX ADMIN — ACETAMINOPHEN 1000 MG: 500 TABLET ORAL at 00:52

## 2022-12-04 RX ADMIN — ALPRAZOLAM 0.25 MG: 0.25 TABLET ORAL at 11:37

## 2022-12-04 RX ADMIN — ACETAMINOPHEN 1000 MG: 500 TABLET ORAL at 15:48

## 2022-12-04 RX ADMIN — FENTANYL CITRATE 100 MCG: 50 INJECTION, SOLUTION INTRAMUSCULAR; INTRAVENOUS at 00:52

## 2022-12-04 NOTE — PROGRESS NOTES
1900: report received from DAVIAN Huerta. Pt requesting nausea medication, Zofran given  1930: pt ambulated and voided. Gait steady. Pt feeling nauseous initially which resolved  2050: pt requesting pain medication, fentanyl given  2200: pt requesting xanax, given  0330: offered pain intervention, pt declines. Coping with rest at this time. Continuing 2L O2 via NC while sleeping. Pt able to maintain O2 WNL while awake. Currently on 2L via NC  0700: pt declines autopsy or  care. Follow up RN can call at anytime and leave a  if no answer. Bleeding light throughout the night. Passed two small clots. Dr. Sosa aware. Planning recheck of labs. Pt resting, declines needs. Report given to DAVIAN Strickland

## 2022-12-04 NOTE — PROGRESS NOTES
GYN Progress Note:    Subjective:   Pt reports feeling well, still with cramping.  Has voided, minimal vaginal bleeding.  Some claminess overnight, denies dizziness/lightheadedness with ambulation.      24hr VS:  Temp:  [36.2 °C (97.1 °F)-36.3 °C (97.3 °F)] 36.2 °C (97.1 °F)  Pulse:  [0-109] 94  BP: ()/(54-61) 104/56  SpO2:  [90 %-95 %] 93 %        Intake/Output Summary (Last 24 hours) at 12/4/2022 0723  Last data filed at 12/3/2022 1647  Gross per 24 hour   Intake 800 ml   Output 530 ml   Net 270 ml     gen: AAO, NAD  CV: RRR  Resp: clear to auscultation bilaterally  Abd: soft, ND, fundus not palpable    Ext: NT, tr edema bilaterally    Meds:   Current Facility-Administered Medications:     fentaNYL (SUBLIMAZE) injection 50 mcg, 50 mcg, Intravenous, Q HOUR PRN, Afsaneh Jauregui M.D.    fentaNYL (SUBLIMAZE) injection 100 mcg, 100 mcg, Intravenous, Q HOUR PRN, Afsaneh Jauregui M.D., 100 mcg at 12/04/22 0052    ondansetron (ZOFRAN) syringe/vial injection 4 mg, 4 mg, Intravenous, Q4HRS PRN, Sue Hardin M.D., 4 mg at 12/03/22 1930    lactated ringers infusion, , Intravenous, Continuous, Franki Saldana M.D.    ibuprofen (MOTRIN) tablet 800 mg, 800 mg, Oral, Q6HRS PRN, Sue Hardin M.D., 800 mg at 12/03/22 2151    acetaminophen (TYLENOL) tablet 1,000 mg, 1,000 mg, Oral, Q4HRS PRN, Sue Hardin M.D., 1,000 mg at 12/04/22 0052    Tranexamic Acid (CYKLOKAPRON) 1,000 mg in  mL IVPB, 1,000 mg, Intravenous, Once PRN, Afsaneh Jauregui M.D.    oxytocin (PITOCIN) injection 10 Units, 10 Units, Intramuscular, Once PRN, Afsaneh Jauregui M.D.    hydrOXYzine HCl (ATARAX) tablet 25 mg, 25 mg, Oral, TID PRN, Afsaneh Jauregui M.D., 25 mg at 12/02/22 1559    ALPRAZolam (XANAX) tablet 0.25 mg, 0.25 mg, Oral, HS PRN, Afsaneh Jauregui M.D., 0.25 mg at 12/03/22 2151    lactated ringers infusion, , Intravenous, Continuous, Afsaneh Jauregui M.D., Last Rate: 125 mL/hr at 12/03/22 0509, New  Bag at 22 050      Lab:    Latest Reference Range & Units 22 21:16 22 05:39   Hemoglobin 12.0 - 16.0 g/dL 8.3 (L) 7.3 (L)   Hematocrit 37.0 - 47.0 % 26.1 (L) 22.6 (L)   MCV 81.4 - 97.8 fL 79.8 (L) 81.0 (L)   MCH 27.0 - 33.0 pg 25.4 (L) 26.2 (L)   MCHC 33.6 - 35.0 g/dL 31.8 (L) 32.3 (L)   RDW 35.9 - 50.0 fL 53.7 (H) 54.6 (H)   Platelet Count 164 - 446 K/uL 357 317   (L): Data is abnormally low  (H): Data is abnormally high     Latest Reference Range & Units 22 05:39   Sodium 135 - 145 mmol/L 132 (L)   Potassium 3.6 - 5.5 mmol/L 3.8   Chloride 96 - 112 mmol/L 102   Co2 20 - 33 mmol/L 20   Anion Gap 7.0 - 16.0  10.0   Glucose 65 - 99 mg/dL 118 (H)   Bun 8 - 22 mg/dL 12   Creatinine 0.50 - 1.40 mg/dL 0.64   GFR (CKD-EPI) >60 mL/min/1.73 m 2 118   Calcium 8.5 - 10.5 mg/dL 8.7   AST(SGOT) 12 - 45 U/L 74 (H)   ALT(SGPT) 2 - 50 U/L 90 (H)   Alkaline Phosphatase 30 - 99 U/L 84   Total Bilirubin 0.1 - 1.5 mg/dL 0.3   Albumin 3.2 - 4.9 g/dL 3.0 (L)   Total Protein 6.0 - 8.2 g/dL 5.6 (L)   Globulin 1.9 - 3.5 g/dL 2.6   A-G Ratio g/dL 1.2   (L): Data is abnormally low  (H): Data is abnormally high  \    A/P: 32 y.o.  POD 1 s/p D&E for acute vaginal bleeding yr90d3p .  - postop: pt doing well, minimal VB.  Hgb decreased some this AM, no signs of continued hemorrhage.  Denies sxs of anemia, will repeat this afternoon  - elevated WBC count sponaneously decreasing, no signs/sxs of infection  - LFTs: small increase from yesterday, will repeat this afternoon.      - VTE ppx: SCDs    dispo: anticipate discharge later today vs tomorrow.      Sue Hardin MD  [facility], [facility]

## 2022-12-04 NOTE — PROGRESS NOTES
Came to check on pt postop.    Pt reports feeling OK, tired.  Reports vaginal and abdominal soreness as well as cramping.     Pulse: 90s  BP: 120/61  UOP: approx 400cc since procedure.  Gen: NAD, pt appears pale  : only trace blood on pad  Ext: tr edema, NT     Latest Reference Range & Units 22 17:09   WBC 4.8 - 10.8 K/uL 28.6 (H)   RBC 4.20 - 5.40 M/uL 3.29 (L)   Hemoglobin 12.0 - 16.0 g/dL 8.4 (L)   Hematocrit 37.0 - 47.0 % 26.6 (L)   MCV 81.4 - 97.8 fL 80.9 (L)   MCH 27.0 - 33.0 pg 25.5 (L)   MCHC 33.6 - 35.0 g/dL 31.6 (L)   RDW 35.9 - 50.0 fL 54.8 (H)   Platelet Count 164 - 446 K/uL 314   (H): Data is abnormally high  (L): Data is abnormally low      A/P: 31yo  POD 0 s/p D&E for vaginal hemorrhage in pregnancy at 16w5d  - pt doing well, hgb as above immediately postop (8.5 preop), anticipate she will decrease some given EBL of 500cc.  Not currently symptomatic but suspect she may need additional blood overnight.  Will repeat CBC with CMP at 2100  - repeat CMP, still unclear etiology of mildly elevated LFTs    - POCs sent for pathology as well as genetics as desired by patient      Sue Hardin MD  [facility]

## 2022-12-04 NOTE — PROGRESS NOTES
0700: Report received from Jessica WOLF RN. POC discussed.    0820: Assessment done. Light bleeding noted on pad. Discussed POC with pt and family. Encouraged pt to call with needs.     1515: Dr. Grider at bedside. MD discussing pt's most recent lab results with pt and family. MD ordering 2 units RBCs to be given.     1900: Report given to Jessica WOLF RN. POC discussed.

## 2022-12-04 NOTE — OP REPORT
Operative Report  22      PreOp Diagnosis:   1.SIZARA @ 16w5d  2. Acute vaginal hemorrhage    PostOp Diagnosis:   S/p D&E      Procedure(s):  DILATION AND EVACUATION, UTERUS under ultrasound guidance    Surgeon(s):  Sue Hardin M.D.    US performed by Rupali Ramsey MD    Anesthesiologist/Type of Anesthesia:  Anesthesiologist: Franki Saldana M.D./GETMIKE    Indication: 33yo  admitted at 16w4d with acute vaginal hemorrhage requiring 4units RBCs over the prior 24hrs (totaling 9 units for intermittent vaginal bleeding throughout her pregnancy) agreed to termination of pregnancy for maternal hemorrhage.  Initially induction of labor was attempted with cytotec however despite 24hrs of cytotec pt was not delivered and desired D&E at that time.        Specimens removed if any:  Products of conception sent to pathology.    Estimated Blood Loss: 500cc  IVF: 800cc LR  UOP: 30cc    Findings: 16wk size uterus, cervix dilated to 3cm at start of procedure.  All fetal parts accounted for at end of procedure including bilateral upper and lower extremities, thorax, pelvis, and calvarium.      Complications: none      Procedure in Detail:  The pt was taken to the operating room where SCDs were placed and turned on and general anesthesia was initiated.  She was prepped in dorsal lithotomy position in Tobi stirrups.  She did receive IV ancef for preoperative antibiotics.  A mayorga catheter was placed.  A speculum was used to visualize the cervix and the anterior lip was grasped with a single tooth tenaculum.  Sopher forceps were advanced into the uterus under direct ultrasound guidance and several passes were made evacuating the fetus and a portion of the placenta.  The calvarium was able to be decompressed with the sopher forceps and removed easily.  Once all fetal parts were removed from the uterus, the size 16 suction curette was advanced into the uterus, also under ultrasound guidance, and the placenta was  evacuated.  A bovine curette was advanced under US guidance with gritty texture appreciated circumferentially.  An additional pass was made with the suction curette with some accumulation of blood in the uterus on US. At this point hte pt began bleeding briskly and bimanual exam was performed while 0.2mg IM methergine was given IM, 1g TXA was given IV, and 800mcg cytotec was given rectally.  Bleeding subsided.  1 additional gentle curettage was performed with the bovine curette again appreciating a gritty texture circumferentially with no evidence of retained POCs on US.  The uterus contracted well and the bleeding decreased to a normal level.  All instruments were removed from the vagina.  The pt was awakened from GETA and taken to recovery in stable condition.  The fetal parts were examined before the end of procedure to ensure 4 extremities, thorax, pelvis, and calvarium were all accounted for.  All counts were correct.  There were no complications.    Sue Hardin MD  [facility]

## 2022-12-04 NOTE — PROGRESS NOTES
"OB Progress Note    Comfortable. Walking around no dizziness. No RUQ discomfort. Eating and voiding without difficulty. Denies pain.     BP (!) 97/61   Pulse 81   Temp 35.9 °C (96.7 °F) (Temporal)   Resp 16   Ht 1.676 m (5' 6\")   Wt 104 kg (230 lb 2.6 oz)   SpO2 95%     General: NAD  Abdomen: soft, NT, ND  Ext: no edema    Recent Labs     12/02/22  1150 12/03/22  0800 12/03/22  1709 12/03/22  2116 12/04/22  0539 12/04/22  1319   WBC 17.0*   < > 28.6* 27.9* 21.2* 15.3*   RBC 3.32*   < > 3.29* 3.27* 2.79* 2.87*   HEMOGLOBIN 8.6*   < > 8.4* 8.3* 7.3* 7.3*   HEMATOCRIT 26.0*   < > 26.6* 26.1* 22.6* 23.6*   MCV 78.3*   < > 80.9* 79.8* 81.0* 82.2   MCH 25.9*   < > 25.5* 25.4* 26.2* 25.4*   RDW 48.8   < > 54.8* 53.7* 54.6* 54.4*   PLATELETCT 378   < > 314 357 317 305   MPV 9.1   < > 8.9* 8.9* 9.2 9.3   NEUTSPOLYS 87.10*  --  85.70*  --   --   --    LYMPHOCYTES 9.50*  --  4.60*  --   --   --    MONOCYTES 1.70  --  5.20  --   --   --    EOSINOPHILS 0.80  --  0.20  --   --   --    BASOPHILS 0.00  --  0.60  --   --   --    RBCMORPHOLO Present  --   --   --   --   --     < > = values in this interval not displayed.     Cr:0.64  ALT:118 (90)  AST: 112(74)    Assessment:  POD#1 s/p D&E  Suspected triploidy  Liver transaminitis    Plan:  Reviewed findings with patient and spouse. Her anemia has stabilized but her transaminitis has increased. No other signs or symptoms of preeclampsia. Likely due to ischemic liver injury. Patient is amenable to transfusion of 2 additional units PRBC and will recheck.     Xiang Grider M.D.    "

## 2022-12-04 NOTE — ANESTHESIA POSTPROCEDURE EVALUATION
Patient: Martha Rubio    Procedure Summary     Date: 12/03/22 Room / Location: [facility] OR 02 / SURGERY LABOR AND DELIVERY    Anesthesia Start: 1539 Anesthesia Stop: 1627    Procedure: DILATION AND EVACUATION, UTERUS (Vagina ) Diagnosis: (Failure to progress)    Surgeons: Sue Hardin M.D. Responsible Provider: Franki Saldana M.D.    Anesthesia Type: general ASA Status: 2          Final Anesthesia Type: general  Last vitals  BP   Blood Pressure: 121/58    Temp   36.4 °C (97.5 °F)    Pulse   88   Resp   16    SpO2   96 %      Anesthesia Post Evaluation    Patient location during evaluation: floor  Patient participation: complete - patient participated  Level of consciousness: awake and alert  Pain score: 2    Airway patency: patent  Anesthetic complications: no  Cardiovascular status: hemodynamically stable  Respiratory status: acceptable  Hydration status: euvolemic    PONV: none          No notable events documented.     Nurse Pain Score: 7 (NPRS)

## 2022-12-04 NOTE — DISCHARGE PLANNING
:    Referral: Fetal demise    Intervention:  Met with parents: Martha Rubio and Kendell Gann.  MOB delivered infant at 16.5 weeks.  SW provided parents with grief support, counseling, and mortuary information.  Parents selected [facility] located on [facility].  Support provided to parents.    Plan:  Nothing further at this time.

## 2022-12-05 VITALS
OXYGEN SATURATION: 82 % | BODY MASS INDEX: 36.99 KG/M2 | RESPIRATION RATE: 14 BRPM | SYSTOLIC BLOOD PRESSURE: 100 MMHG | HEIGHT: 66 IN | TEMPERATURE: 96.5 F | WEIGHT: 230.16 LBS | HEART RATE: 70 BPM | DIASTOLIC BLOOD PRESSURE: 60 MMHG

## 2022-12-05 LAB
ALBUMIN SERPL BCP-MCNC: 2.8 G/DL (ref 3.2–4.9)
ALBUMIN SERPL BCP-MCNC: 3.1 G/DL (ref 3.2–4.9)
ALBUMIN/GLOB SERPL: 1.1 G/DL
ALBUMIN/GLOB SERPL: 1.2 G/DL
ALP SERPL-CCNC: 73 U/L (ref 30–99)
ALP SERPL-CCNC: 75 U/L (ref 30–99)
ALT SERPL-CCNC: 118 U/L (ref 2–50)
ALT SERPL-CCNC: 120 U/L (ref 2–50)
ANION GAP SERPL CALC-SCNC: 11 MMOL/L (ref 7–16)
ANION GAP SERPL CALC-SCNC: 9 MMOL/L (ref 7–16)
ANISOCYTOSIS BLD QL SMEAR: ABNORMAL
ANISOCYTOSIS BLD QL SMEAR: ABNORMAL
AST SERPL-CCNC: 87 U/L (ref 12–45)
AST SERPL-CCNC: 97 U/L (ref 12–45)
BASOPHILS # BLD AUTO: 0 % (ref 0–1.8)
BASOPHILS # BLD AUTO: 0 % (ref 0–1.8)
BASOPHILS # BLD: 0 K/UL (ref 0–0.12)
BASOPHILS # BLD: 0 K/UL (ref 0–0.12)
BILIRUB SERPL-MCNC: 0.3 MG/DL (ref 0.1–1.5)
BILIRUB SERPL-MCNC: 0.3 MG/DL (ref 0.1–1.5)
BUN SERPL-MCNC: 13 MG/DL (ref 8–22)
BUN SERPL-MCNC: 13 MG/DL (ref 8–22)
BURR CELLS BLD QL SMEAR: NORMAL
CALCIUM SERPL-MCNC: 8.3 MG/DL (ref 8.5–10.5)
CALCIUM SERPL-MCNC: 8.5 MG/DL (ref 8.5–10.5)
CHLORIDE SERPL-SCNC: 107 MMOL/L (ref 96–112)
CHLORIDE SERPL-SCNC: 108 MMOL/L (ref 96–112)
CO2 SERPL-SCNC: 21 MMOL/L (ref 20–33)
CO2 SERPL-SCNC: 22 MMOL/L (ref 20–33)
CREAT SERPL-MCNC: 0.55 MG/DL (ref 0.5–1.4)
CREAT SERPL-MCNC: 0.66 MG/DL (ref 0.5–1.4)
DACRYOCYTES BLD QL SMEAR: NORMAL
DACRYOCYTES BLD QL SMEAR: NORMAL
EOSINOPHIL # BLD AUTO: 0 K/UL (ref 0–0.51)
EOSINOPHIL # BLD AUTO: 0 K/UL (ref 0–0.51)
EOSINOPHIL NFR BLD: 0 % (ref 0–6.9)
EOSINOPHIL NFR BLD: 0 % (ref 0–6.9)
ERYTHROCYTE [DISTWIDTH] IN BLOOD BY AUTOMATED COUNT: 53.5 FL (ref 35.9–50)
ERYTHROCYTE [DISTWIDTH] IN BLOOD BY AUTOMATED COUNT: 54.1 FL (ref 35.9–50)
GFR SERPLBLD CREATININE-BSD FMLA CKD-EPI: 117 ML/MIN/1.73 M 2
GFR SERPLBLD CREATININE-BSD FMLA CKD-EPI: 123 ML/MIN/1.73 M 2
GLOBULIN SER CALC-MCNC: 2.5 G/DL (ref 1.9–3.5)
GLOBULIN SER CALC-MCNC: 2.6 G/DL (ref 1.9–3.5)
GLUCOSE SERPL-MCNC: 114 MG/DL (ref 65–99)
GLUCOSE SERPL-MCNC: 98 MG/DL (ref 65–99)
HCT VFR BLD AUTO: 26 % (ref 37–47)
HCT VFR BLD AUTO: 29.4 % (ref 37–47)
HGB BLD-MCNC: 8.4 G/DL (ref 12–16)
HGB BLD-MCNC: 9.4 G/DL (ref 12–16)
LYMPHOCYTES # BLD AUTO: 1.02 K/UL (ref 1–4.8)
LYMPHOCYTES # BLD AUTO: 1.68 K/UL (ref 1–4.8)
LYMPHOCYTES NFR BLD: 15.7 % (ref 22–41)
LYMPHOCYTES NFR BLD: 8.5 % (ref 22–41)
MACROCYTES BLD QL SMEAR: ABNORMAL
MANUAL DIFF BLD: NORMAL
MANUAL DIFF BLD: NORMAL
MCH RBC QN AUTO: 26.7 PG (ref 27–33)
MCH RBC QN AUTO: 26.9 PG (ref 27–33)
MCHC RBC AUTO-ENTMCNC: 32 G/DL (ref 33.6–35)
MCHC RBC AUTO-ENTMCNC: 32.3 G/DL (ref 33.6–35)
MCV RBC AUTO: 83.3 FL (ref 81.4–97.8)
MCV RBC AUTO: 83.5 FL (ref 81.4–97.8)
METAMYELOCYTES NFR BLD MANUAL: 1.7 %
MICROCYTES BLD QL SMEAR: ABNORMAL
MICROCYTES BLD QL SMEAR: ABNORMAL
MONOCYTES # BLD AUTO: 0 K/UL (ref 0–0.85)
MONOCYTES # BLD AUTO: 0.37 K/UL (ref 0–0.85)
MONOCYTES NFR BLD AUTO: 0 % (ref 0–13.4)
MONOCYTES NFR BLD AUTO: 3.5 % (ref 0–13.4)
MORPHOLOGY BLD-IMP: NORMAL
MORPHOLOGY BLD-IMP: NORMAL
MYELOCYTES NFR BLD MANUAL: 1.7 %
MYELOCYTES NFR BLD MANUAL: 2.5 %
NEUTROPHILS # BLD AUTO: 10.68 K/UL (ref 2–7.15)
NEUTROPHILS # BLD AUTO: 8.19 K/UL (ref 2–7.15)
NEUTROPHILS NFR BLD: 74.8 % (ref 44–72)
NEUTROPHILS NFR BLD: 87.3 % (ref 44–72)
NEUTS BAND NFR BLD MANUAL: 1.7 % (ref 0–10)
NEUTS BAND NFR BLD MANUAL: 1.7 % (ref 0–10)
NRBC # BLD AUTO: 0.14 K/UL
NRBC # BLD AUTO: 0.2 K/UL
NRBC BLD-RTO: 1.2 /100 WBC
NRBC BLD-RTO: 1.9 /100 WBC
PATHOLOGY CONSULT NOTE: NORMAL
PLATELET # BLD AUTO: 292 K/UL (ref 164–446)
PLATELET # BLD AUTO: 293 K/UL (ref 164–446)
PLATELET BLD QL SMEAR: NORMAL
PLATELET BLD QL SMEAR: NORMAL
PMV BLD AUTO: 9.1 FL (ref 9–12.9)
PMV BLD AUTO: 9.3 FL (ref 9–12.9)
POIKILOCYTOSIS BLD QL SMEAR: NORMAL
POIKILOCYTOSIS BLD QL SMEAR: NORMAL
POLYCHROMASIA BLD QL SMEAR: NORMAL
POLYCHROMASIA BLD QL SMEAR: NORMAL
POTASSIUM SERPL-SCNC: 3.5 MMOL/L (ref 3.6–5.5)
POTASSIUM SERPL-SCNC: 3.9 MMOL/L (ref 3.6–5.5)
PROMYELOCYTES NFR BLD MANUAL: 0.9 %
PROT SERPL-MCNC: 5.3 G/DL (ref 6–8.2)
PROT SERPL-MCNC: 5.7 G/DL (ref 6–8.2)
RBC # BLD AUTO: 3.12 M/UL (ref 4.2–5.4)
RBC # BLD AUTO: 3.52 M/UL (ref 4.2–5.4)
RBC BLD AUTO: PRESENT
RBC BLD AUTO: PRESENT
SCHISTOCYTES BLD QL SMEAR: NORMAL
SODIUM SERPL-SCNC: 139 MMOL/L (ref 135–145)
SODIUM SERPL-SCNC: 139 MMOL/L (ref 135–145)
WBC # BLD AUTO: 10.7 K/UL (ref 4.8–10.8)
WBC # BLD AUTO: 12 K/UL (ref 4.8–10.8)

## 2022-12-05 PROCEDURE — A9270 NON-COVERED ITEM OR SERVICE: HCPCS | Performed by: OBSTETRICS & GYNECOLOGY

## 2022-12-05 PROCEDURE — 36415 COLL VENOUS BLD VENIPUNCTURE: CPT

## 2022-12-05 PROCEDURE — 700102 HCHG RX REV CODE 250 W/ 637 OVERRIDE(OP): Performed by: OBSTETRICS & GYNECOLOGY

## 2022-12-05 PROCEDURE — 85007 BL SMEAR W/DIFF WBC COUNT: CPT

## 2022-12-05 PROCEDURE — 85025 COMPLETE CBC W/AUTO DIFF WBC: CPT

## 2022-12-05 PROCEDURE — 80053 COMPREHEN METABOLIC PANEL: CPT

## 2022-12-05 RX ADMIN — IBUPROFEN 800 MG: 800 TABLET, FILM COATED ORAL at 07:40

## 2022-12-05 ASSESSMENT — PAIN DESCRIPTION - PAIN TYPE
TYPE: ACUTE PAIN
TYPE: ACUTE PAIN

## 2022-12-05 NOTE — DISCHARGE SUMMARY
DATE OF ADMISSION:  2022   DATE OF DISCHARGE:  2022     ADMISSION DIAGNOSIS:  Acute blood loss anemia with intrauterine pregnancy at   16 weeks and 3 days.     DISCHARGE DIAGNOSES:  Acute blood loss anemia with intrauterine pregnancy at   16 weeks and 3 days, status post dilation and evacuation of 16-week fetus.     HOSPITAL COURSE:  This is a 32-year-old -0-3-2 was admitted on 2022   complaining of acute blood loss on vaginal bleeding.  The patient has been   followed by myself since 6 weeks' gestation when she has off and on had   significant and severe vaginal bleeding requiring 9 units of blood products.    She was admitted at 16 weeks and ____ days after receiving her 9th unit of   blood.  She was counseled, options were given and she proceeded to elect for   induction of labor secondary to maternal need. The patient then had multiple   doses of Cytotec; however, never vaginally delivered.  She did progress to 2   cm.  At that point, she was taken to the operating room by Dr. Soas and   underwent dilation and evacuation of 16-week fetus.  Since then, she has been   doing well. This morning she has only minimal bleeding.  She states she feels   better than she has in a while, has no nausea, vomiting, chest pain, shortness   of breath and desires discharge today.  Of note, her hemoglobin and   hematocrit are now stable.  However, she has had elevated liver enzymes.  Her   liver enzymes yesterday, she had an AST of 74 and an ALT of 90, this morning   they are 87 and 120.  Of note, they did trend upwards some and now are   trending back down.  She has no other evidence of HELLP syndrome or severe   preeclampsia.  Her blood pressures are normal.  Her platelets are normal.  The   remainder of her labs are within normal limits.  Her creatinine is 0.55.     PHYSICAL EXAMINATION:  GENERAL:  She is a well-developed, well-nourished female in no apparent   distress.  VITAL SIGNS:  Stable.  She has  been afebrile throughout her admission.  HEART:  Regular rate and rhythm.  LUNGS:  Clear.  ABDOMEN:  Soft and nontender.  EXTREMITIES:  No clubbing, cyanosis or edema.     PLAN:  Vaginal bleeding is minimal.  She will be discharged home with followup   tomorrow in my office for another CMP.  She also has iron, which she will   take at home.     DISCHARGE MEDICATIONS:  None.     FOLLOWUP:  Again follow up in my office tomorrow for CMP and in 1 week for   reevaluation.        ______________________________  MD YANET Rogers/WOODY/KAITLYNN    DD:  12/05/2022 10:48  DT:  12/05/2022 14:05    Job#:  [id number]

## 2022-12-05 NOTE — PROGRESS NOTES
1118 Discharge instruction complete, patient has no further questions at this time    1157 Rosie in histology called to confirm that the fetal tissue would be processed for chromosomal abnormalities, Rosie stated that she would pass that on to the pathologist

## 2022-12-05 NOTE — DISCHARGE INSTRUCTIONS
Vaginal Delivery, Care After  This sheet gives you information about how to care for yourself after your delivery. Your health care provider may also give you more specific instructions. If you have problems or questions, contact your health care provider.  What can I expect after the procedure?  After delivery, it is common to have:  Soreness in your abdomen, your vagina, and the area between the opening of your vagina and your anus (perineum).  Tiredness (fatigue).  Cramps.  Breast tenderness related to engorgement.  Some bleeding and discharge from your vagina. This may continue for about 6 weeks. The bleeding and discharge will start out red, then become pink, then yellow, and finally white.  Tenderness in your vagina or perineum if you had an episiotomy or a vaginal tear. This may last several weeks.  Emotions that change quickly. Common emotions include:  Sadness.  Anger.  Denial.  Guilt.  Depression.  Follow these instructions at home:  Vaginal and perineal care  Keep your perineum clean and dry as told by your health care provider.  Wipe from front to back when you use the toilet.  To relieve pain at the wound area or pain caused by hemorrhoids, try taking a warm sitz bath 2-3 times a day.  Do not use tampons or douche until your health care provider says it is okay.  Medicines  Take over-the-counter and prescription medicines only as told by your health care provider.  If you were prescribed an antibiotic medicine, take it as told by your health care provider. Do not stop taking the antibiotic even if you start to feel better.  Eating and drinking    Drink enough fluids to keep your urine pale yellow.  Eat high-fiber foods every day. These foods may help prevent or relieve constipation. High-fiber foods include:  Whole grain cereals and breads.  Brown rice.  Beans.  Fresh fruits and vegetables.  Activity  If possible, have someone help you with your household activities for at least a few days after you  leave the hospital.  Return to your normal activities as told by your health care provider. Ask your health care provider what activities are safe for you.  Rest as much as possible.  Talk with your health care provider about when you can engage in sexual activity. This may depend on your:  Risk of infection.  Rate of healing.  Comfort and desire to engage in sexual activity.  Emotional support  Consider seeking support for your loss. Some forms of support that you might consider include your , friends, family, a professional counselor, or a bereavement support group.  General instructions  Wear a supportive and well-fitting bra.  If you pass a blood clot, save it and call your health care provider to discuss. Do not flush blood clots down the toilet before you get instructions from your health care provider.  Keep all of your scheduled postpartum visits. At these visits, your health care provider will check to make sure that you are healing, both physically and emotionally.  Contact a health care provider if:  You feel sad or depressed.  You are having trouble eating or sleeping.  You lose interest in activities you used to enjoy.  You pass a blood clot from your vagina.  You have pus or a bad-smelling discharge coming from your wound or vagina.  You have increasing redness, swelling, or pain in your perineal area.  You feel pain or burning when you urinate.  You urinate more often than normal.  You have a fever.  Your breasts become hard, red, or painful.  You are dizzy or light-headed.  You have a rash.  You feel nauseous or you vomit.  You have not had a menstrual period by the 12th week after delivery.  Get help right away if:  You have persistent pain that is not relieved by comfort measures or medicines.  You have chest pain or trouble breathing.  You have blurred vision or you see spots.  You have a severe headache.  You faint.  You have sudden, severe leg pain.  You bleed from your vagina so  much that you fill more than one sanitary pad in one hour. Bleeding should not be heavier than your heaviest period.  You have thoughts of hurting yourself.  If you ever feel like you may hurt yourself or others, or have thoughts about taking your own life, get help right away. You can go to your nearest emergency department or call:  Your local emergency services (911 in the [place]  A suicide crisis helpline, such as the National Suicide Prevention Lifeline at 1-593.459.1273. This is open 24 hours a day.  Summary  Take over-the-counter and prescription medicines only as told by your health care provider.  Return to your normal activities as told by your health care provider. Ask your health care provider what activities are safe for you.  Consider getting support for your loss. Sources of support include [employer] leaders, friends, family, professional counselors, and bereavement support groups.  Keep all follow-up visits as told by your health care provider. This is important.  This information is not intended to replace advice given to you by your health care provider. Make sure you discuss any questions you have with your health care provider.  Document Released: 05/04/2015 Document Revised: 01/02/2019 Document Reviewed: 03/29/2018  Elsevier Patient Education © 2020 [employer]

## 2022-12-05 NOTE — PROGRESS NOTES
1900: report received from DAVIAN Strickland. Pt denies pain. Blood transfusion running. Verbal order per Dr. Grider for Benadryl 50 mg due to flushing. Pt states she takes Benadryl prior to transfusions. VSS, no signs of adverse reaction  2110: 2 units RBC transfused. Per Dr. Grider plan recheck of CBC and CMP at 0100. Pt aware of plan  0700: pt resting throughout night. Report given to DAVIAN Olsen

## 2022-12-15 LAB
KARYOTYP TISS FETUS: NORMAL
PATHOLOGY STUDY: NORMAL

## 2023-11-08 ENCOUNTER — HOSPITAL ENCOUNTER (EMERGENCY)
Facility: MEDICAL CENTER | Age: 33
End: 2023-11-08
Attending: OBSTETRICS & GYNECOLOGY | Admitting: OBSTETRICS & GYNECOLOGY
Payer: MEDICAID

## 2023-11-08 VITALS
RESPIRATION RATE: 16 BRPM | OXYGEN SATURATION: 92 % | WEIGHT: 250 LBS | TEMPERATURE: 99.4 F | HEIGHT: 66 IN | HEART RATE: 107 BPM | BODY MASS INDEX: 40.18 KG/M2 | DIASTOLIC BLOOD PRESSURE: 76 MMHG | SYSTOLIC BLOOD PRESSURE: 138 MMHG

## 2023-11-08 LAB
APPEARANCE UR: CLEAR
COLOR UR AUTO: ABNORMAL
FIBRONECTIN FETAL SPEC QL: NEGATIVE
GLUCOSE UR QL STRIP.AUTO: NEGATIVE MG/DL
KETONES UR QL STRIP.AUTO: ABNORMAL MG/DL
LEUKOCYTE ESTERASE UR QL STRIP.AUTO: NEGATIVE
NITRITE UR QL STRIP.AUTO: NEGATIVE
PH UR STRIP.AUTO: 6 [PH] (ref 5–8)
PROT UR QL STRIP: 30 MG/DL
RBC UR QL AUTO: NEGATIVE
SP GR UR STRIP.AUTO: >=1.03 (ref 1–1.03)

## 2023-11-08 PROCEDURE — 81002 URINALYSIS NONAUTO W/O SCOPE: CPT

## 2023-11-08 PROCEDURE — 82731 ASSAY OF FETAL FIBRONECTIN: CPT

## 2023-11-08 PROCEDURE — 99285 EMERGENCY DEPT VISIT HI MDM: CPT

## 2023-11-08 RX ORDER — ONDANSETRON 4 MG/1
4 TABLET, ORALLY DISINTEGRATING ORAL EVERY 6 HOURS PRN
Status: ON HOLD | COMMUNITY
End: 2024-01-31

## 2023-11-08 ASSESSMENT — FIBROSIS 4 INDEX: FIB4 SCORE: 0.95

## 2023-11-08 NOTE — PROGRESS NOTES
33 y.o.    edc=  25.3 weeks      TOCO and US on.  VSS.  Pt presents to L&D with c/o pressure, tightening and back pain that began this am at 0230.  She denies VB or LOF and states pos FM.  Pt was diagnosed with a short cervix and was told it was originally 3.3 but was 1.3 the last time assessed per the pt.  The patient denies intercourse within the last 24 hours, but she did insert a progesterone suppository last night. UA done=High SG and ketones-water given and encouraged to drink.  1305-Report given to Adam on the above stated.  Orders for an FFN.  1430-Dr. Medina at  to assess pt and discuss negative result of FFN.  SVE=closed/thick.  Pt encouraged to return to the hospital if concerned, follow up with her regularly scheduled appointment in November with [facility], increase water intake and take the rest of the day to relax.  Pt and FOB state understanding.

## 2023-11-08 NOTE — ED PROVIDER NOTES
DATE OF ADMISSION:  2023     IDENTIFICATION:  This is a 33-year-old  6, para 2-1-2-2, with an EDC of   2024 and EGA of 25 and 3/7th weeks, who presents with chief complaint   of pelvic pressure.     HISTORY OF PRESENT ILLNESS:  This is a patient of Dr. Jauregui's who has gotten   good prenatal care.  Of note, she has had an 18-week loss recently prior to   this pregnancy.  She has been followed by perinatology for a shortened cervix.    She takes progesterone, does not have Cerclage in place, and presents today   complaining of some low pelvic pressure.  Denies uterine contractions,   spontaneous rupture of membranes, vaginal bleeding.  She admits good fetal   movement.  Denies symptoms of PIH.  Here in labor and delivery, fetal heart   tones are reactive, appropriate for gestational age.  She is not gerardo.    Her Fetal fibronectin is negative.  Her cervix is long, thick, high, and   posterior.  She has been seen by both [facility] and Dr. Jauregui this week.     OBSTETRICAL HISTORY:  Significant for 2 term vaginal deliveries, a molar   pregnancy, a spontaneous , and an 18-week loss and the current   pregnancy.     GYNECOLOGIC HISTORY:  Denies STDs, abnormal Paps.     PAST MEDICAL HISTORY:  ALLERGIES:  PENICILLIN AND CODEINE.     CURRENT MEDICATIONS:  Zofran, prenatal vitamins, and vaginal progesterone.     MEDICAL PROBLEMS:  None.     SURGICAL HISTORY:  None.     SOCIAL HISTORY:  Denies alcohol, tobacco, or drug abuse.     FAMILY HISTORY:  Noncontributory.     REVIEW OF SYSTEMS:  Times 12 is negative per AMA standards available in the   chart.     LABORATORY DATA:  Urinalysis shows no nitrites or leukocytes.  She does have   trace ketones.  Fetal fibronectin is negative.     PHYSICAL EXAMINATION:    VITAL SIGNS:  The patient is afebrile.  Vital signs within normal limits.    Fetal heart tones are appropriate for gestational age. She is not gerardo.  GENERAL:  She  is awake, alert, in no apparent distress.  NECK:  Supple.  HEART:  Regular.  CHEST:  Clear.  BREASTS:  Symmetrical.  ABDOMEN:  Soft, gravid, size appropriate for dates.  EXTREMITIES:  Negative.  GYNECOLOGIC:  As above.     ASSESSMENT:  At this time:  1.  Pregnancy at 25 and 3/7th weeks.  2.  Pelvic pressure.  3.  Short cervix with negative fetal fibronectin and cervix is long and   closed.  4.  Fetal status reassuring.     PLAN:  At this time:  1.  Discharge home.  2.  Rest, hydration.  3.   labor precautions.  4.  We will follow up with Dr. Jauregui in [facility] in the   next 2 weeks.  She will come back to labor and delivery with change in her   status or fetal status.        ______________________________  MD ROSI Condon/MYA    DD:  2023 14:42  DT:  2023 15:57    Job#:  [id number]

## 2023-12-18 ENCOUNTER — HOSPITAL ENCOUNTER (OUTPATIENT)
Dept: LAB | Facility: MEDICAL CENTER | Age: 33
End: 2023-12-18
Attending: NURSE PRACTITIONER
Payer: MEDICAID

## 2023-12-18 LAB
AMBIGUOUS DTTM AMBI4: NORMAL
FIBRONECTIN FETAL SPEC QL: NEGATIVE

## 2023-12-18 PROCEDURE — 82731 ASSAY OF FETAL FIBRONECTIN: CPT

## 2023-12-22 DIAGNOSIS — D50.9 IRON DEFICIENCY ANEMIA, UNSPECIFIED IRON DEFICIENCY ANEMIA TYPE: ICD-10-CM

## 2023-12-22 RX ORDER — METHYLPREDNISOLONE SODIUM SUCCINATE 125 MG/2ML
125 INJECTION, POWDER, LYOPHILIZED, FOR SOLUTION INTRAMUSCULAR; INTRAVENOUS PRN
Status: CANCELLED | OUTPATIENT
Start: 2023-12-22

## 2023-12-22 RX ORDER — SODIUM CHLORIDE 9 MG/ML
INJECTION, SOLUTION INTRAVENOUS CONTINUOUS
Status: CANCELLED | OUTPATIENT
Start: 2023-12-22

## 2023-12-22 RX ORDER — METHYLPREDNISOLONE SODIUM SUCCINATE 125 MG/2ML
125 INJECTION, POWDER, LYOPHILIZED, FOR SOLUTION INTRAMUSCULAR; INTRAVENOUS PRN
OUTPATIENT
Start: 2023-12-22

## 2023-12-22 RX ORDER — EPINEPHRINE 1 MG/ML(1)
0.5 AMPUL (ML) INJECTION PRN
Status: CANCELLED | OUTPATIENT
Start: 2023-12-22

## 2023-12-22 RX ORDER — DIPHENHYDRAMINE HYDROCHLORIDE 50 MG/ML
50 INJECTION INTRAMUSCULAR; INTRAVENOUS PRN
Status: CANCELLED | OUTPATIENT
Start: 2023-12-22

## 2024-01-05 ENCOUNTER — HOSPITAL ENCOUNTER (INPATIENT)
Facility: MEDICAL CENTER | Age: 34
LOS: 1 days | DRG: 832 | End: 2024-01-06
Attending: OBSTETRICS & GYNECOLOGY | Admitting: OBSTETRICS & GYNECOLOGY
Payer: MEDICAID

## 2024-01-05 DIAGNOSIS — D50.9 IRON DEFICIENCY ANEMIA, UNSPECIFIED IRON DEFICIENCY ANEMIA TYPE: ICD-10-CM

## 2024-01-05 DIAGNOSIS — O60.03 PRETERM LABOR IN THIRD TRIMESTER WITHOUT DELIVERY: ICD-10-CM

## 2024-01-05 LAB
ABO GROUP BLD: ABNORMAL
BARCODED ABORH UBTYP: 6200
BARCODED ABORH UBTYP: 6200
BARCODED PRD CODE UBPRD: ABNORMAL
BARCODED PRD CODE UBPRD: ABNORMAL
BARCODED UNIT NUM UBUNT: ABNORMAL
BARCODED UNIT NUM UBUNT: ABNORMAL
BASOPHILS # BLD AUTO: 0.1 % (ref 0–1.8)
BASOPHILS # BLD: 0.01 K/UL (ref 0–0.12)
BLD GP AB INVEST PLASRBC-IMP: ABNORMAL
BLD GP AB SCN SERPL QL: ABNORMAL
COMPONENT R 8504R: ABNORMAL
COMPONENT R 8504R: ABNORMAL
EOSINOPHIL # BLD AUTO: 0.01 K/UL (ref 0–0.51)
EOSINOPHIL NFR BLD: 0.1 % (ref 0–6.9)
ERYTHROCYTE [DISTWIDTH] IN BLOOD BY AUTOMATED COUNT: 40.7 FL (ref 35.9–50)
HCT VFR BLD AUTO: 29 % (ref 37–47)
HGB BLD-MCNC: 8.8 G/DL (ref 12–16)
HOLDING TUBE BB 8507: NORMAL
IMM GRANULOCYTES # BLD AUTO: 0.18 K/UL (ref 0–0.11)
IMM GRANULOCYTES NFR BLD AUTO: 1.5 % (ref 0–0.9)
KELL GROUP AG RBC: NORMAL
LYMPHOCYTES # BLD AUTO: 1.09 K/UL (ref 1–4.8)
LYMPHOCYTES NFR BLD: 9.1 % (ref 22–41)
MCH RBC QN AUTO: 21.2 PG (ref 27–33)
MCHC RBC AUTO-ENTMCNC: 30.3 G/DL (ref 32.2–35.5)
MCV RBC AUTO: 69.7 FL (ref 81.4–97.8)
MONOCYTES # BLD AUTO: 0.54 K/UL (ref 0–0.85)
MONOCYTES NFR BLD AUTO: 4.5 % (ref 0–13.4)
NEUTROPHILS # BLD AUTO: 10.09 K/UL (ref 1.82–7.42)
NEUTROPHILS NFR BLD: 84.7 % (ref 44–72)
NRBC # BLD AUTO: 0.03 K/UL
NRBC BLD-RTO: 0.3 /100 WBC (ref 0–0.2)
PLATELET # BLD AUTO: 320 K/UL (ref 164–446)
PMV BLD AUTO: 9.4 FL (ref 9–12.9)
PRODUCT TYPE UPROD: ABNORMAL
PRODUCT TYPE UPROD: ABNORMAL
RBC # BLD AUTO: 4.16 M/UL (ref 4.2–5.4)
RH BLD: ABNORMAL
T PALLIDUM AB SER QL IA: NORMAL
UNIT STATUS USTAT: ABNORMAL
UNIT STATUS USTAT: ABNORMAL
WBC # BLD AUTO: 11.9 K/UL (ref 4.8–10.8)

## 2024-01-05 PROCEDURE — 86850 RBC ANTIBODY SCREEN: CPT

## 2024-01-05 PROCEDURE — 86901 BLOOD TYPING SEROLOGIC RH(D): CPT

## 2024-01-05 PROCEDURE — 86870 RBC ANTIBODY IDENTIFICATION: CPT

## 2024-01-05 PROCEDURE — 86900 BLOOD TYPING SEROLOGIC ABO: CPT

## 2024-01-05 PROCEDURE — 302449 STATCHG TRIAGE ONLY (STATISTIC)

## 2024-01-05 PROCEDURE — 700111 HCHG RX REV CODE 636 W/ 250 OVERRIDE (IP): Mod: JZ | Performed by: OBSTETRICS & GYNECOLOGY

## 2024-01-05 PROCEDURE — 700105 HCHG RX REV CODE 258: Performed by: OBSTETRICS & GYNECOLOGY

## 2024-01-05 PROCEDURE — 86780 TREPONEMA PALLIDUM: CPT

## 2024-01-05 PROCEDURE — 86922 COMPATIBILITY TEST ANTIGLOB: CPT

## 2024-01-05 PROCEDURE — 770002 HCHG ROOM/CARE - OB PRIVATE (112)

## 2024-01-05 PROCEDURE — 85025 COMPLETE CBC W/AUTO DIFF WBC: CPT

## 2024-01-05 PROCEDURE — 86905 BLOOD TYPING RBC ANTIGENS: CPT

## 2024-01-05 PROCEDURE — A9270 NON-COVERED ITEM OR SERVICE: HCPCS | Performed by: OBSTETRICS & GYNECOLOGY

## 2024-01-05 PROCEDURE — 87150 DNA/RNA AMPLIFIED PROBE: CPT

## 2024-01-05 PROCEDURE — 302790 HCHG STAT ANTEPARTUM CARE, DAILY

## 2024-01-05 PROCEDURE — 87081 CULTURE SCREEN ONLY: CPT

## 2024-01-05 PROCEDURE — 700102 HCHG RX REV CODE 250 W/ 637 OVERRIDE(OP): Performed by: OBSTETRICS & GYNECOLOGY

## 2024-01-05 PROCEDURE — 36415 COLL VENOUS BLD VENIPUNCTURE: CPT

## 2024-01-05 PROCEDURE — 86902 BLOOD TYPE ANTIGEN DONOR EA: CPT

## 2024-01-05 RX ORDER — EPINEPHRINE 1 MG/ML(1)
0.5 AMPUL (ML) INJECTION PRN
OUTPATIENT
Start: 2024-01-05

## 2024-01-05 RX ORDER — TERBUTALINE SULFATE 1 MG/ML
0.25 INJECTION, SOLUTION SUBCUTANEOUS
Status: DISCONTINUED | OUTPATIENT
Start: 2024-01-05 | End: 2024-01-06 | Stop reason: HOSPADM

## 2024-01-05 RX ORDER — EPINEPHRINE 1 MG/ML(1)
0.5 AMPUL (ML) INJECTION PRN
Status: COMPLETED | OUTPATIENT
Start: 2024-01-05 | End: 2024-01-05

## 2024-01-05 RX ORDER — NIFEDIPINE 10 MG/1
10 CAPSULE ORAL EVERY 6 HOURS
Status: DISCONTINUED | OUTPATIENT
Start: 2024-01-05 | End: 2024-01-06 | Stop reason: HOSPADM

## 2024-01-05 RX ORDER — METHYLPREDNISOLONE SODIUM SUCCINATE 125 MG/2ML
125 INJECTION, POWDER, LYOPHILIZED, FOR SOLUTION INTRAMUSCULAR; INTRAVENOUS PRN
OUTPATIENT
Start: 2024-01-05

## 2024-01-05 RX ORDER — TERBUTALINE SULFATE 1 MG/ML
0.25 INJECTION, SOLUTION SUBCUTANEOUS
Status: CANCELLED | OUTPATIENT
Start: 2024-01-05

## 2024-01-05 RX ORDER — DIPHENHYDRAMINE HYDROCHLORIDE 50 MG/ML
50 INJECTION INTRAMUSCULAR; INTRAVENOUS PRN
Status: COMPLETED | OUTPATIENT
Start: 2024-01-05 | End: 2024-01-05

## 2024-01-05 RX ORDER — METHYLPREDNISOLONE SODIUM SUCCINATE 125 MG/2ML
125 INJECTION, POWDER, LYOPHILIZED, FOR SOLUTION INTRAMUSCULAR; INTRAVENOUS PRN
Status: DISCONTINUED | OUTPATIENT
Start: 2024-01-05 | End: 2024-01-06 | Stop reason: HOSPADM

## 2024-01-05 RX ORDER — IBUPROFEN 800 MG/1
800 TABLET ORAL
Status: DISCONTINUED | OUTPATIENT
Start: 2024-01-05 | End: 2024-01-06 | Stop reason: HOSPADM

## 2024-01-05 RX ORDER — SODIUM CHLORIDE, SODIUM LACTATE, POTASSIUM CHLORIDE, CALCIUM CHLORIDE 600; 310; 30; 20 MG/100ML; MG/100ML; MG/100ML; MG/100ML
INJECTION, SOLUTION INTRAVENOUS CONTINUOUS
Status: CANCELLED | OUTPATIENT
Start: 2024-01-05

## 2024-01-05 RX ORDER — OXYTOCIN 10 [USP'U]/ML
10 INJECTION, SOLUTION INTRAMUSCULAR; INTRAVENOUS
Status: CANCELLED | OUTPATIENT
Start: 2024-01-05

## 2024-01-05 RX ORDER — LIDOCAINE HYDROCHLORIDE 10 MG/ML
20 INJECTION, SOLUTION INFILTRATION; PERINEURAL
Status: DISCONTINUED | OUTPATIENT
Start: 2024-01-05 | End: 2024-01-06 | Stop reason: HOSPADM

## 2024-01-05 RX ORDER — MISOPROSTOL 200 UG/1
800 TABLET ORAL
Status: DISCONTINUED | OUTPATIENT
Start: 2024-01-05 | End: 2024-01-06 | Stop reason: HOSPADM

## 2024-01-05 RX ORDER — ACETAMINOPHEN 500 MG
1000 TABLET ORAL
Status: DISCONTINUED | OUTPATIENT
Start: 2024-01-05 | End: 2024-01-06 | Stop reason: HOSPADM

## 2024-01-05 RX ORDER — ACETAMINOPHEN 500 MG
1000 TABLET ORAL
Status: CANCELLED | OUTPATIENT
Start: 2024-01-05

## 2024-01-05 RX ORDER — IBUPROFEN 800 MG/1
800 TABLET ORAL
Status: CANCELLED | OUTPATIENT
Start: 2024-01-05

## 2024-01-05 RX ORDER — BETAMETHASONE SODIUM PHOSPHATE AND BETAMETHASONE ACETATE 3; 3 MG/ML; MG/ML
12 INJECTION, SUSPENSION INTRA-ARTICULAR; INTRALESIONAL; INTRAMUSCULAR; SOFT TISSUE ONCE
Status: COMPLETED | OUTPATIENT
Start: 2024-01-05 | End: 2024-01-05

## 2024-01-05 RX ORDER — SODIUM CHLORIDE 9 MG/ML
INJECTION, SOLUTION INTRAVENOUS CONTINUOUS
Status: DISCONTINUED | OUTPATIENT
Start: 2024-01-05 | End: 2024-01-06 | Stop reason: HOSPADM

## 2024-01-05 RX ORDER — HYDROXYZINE HYDROCHLORIDE 25 MG/1
25 TABLET, FILM COATED ORAL ONCE
Status: COMPLETED | OUTPATIENT
Start: 2024-01-05 | End: 2024-01-05

## 2024-01-05 RX ORDER — METHYLPREDNISOLONE SODIUM SUCCINATE 125 MG/2ML
125 INJECTION, POWDER, LYOPHILIZED, FOR SOLUTION INTRAMUSCULAR; INTRAVENOUS PRN
Status: CANCELLED | OUTPATIENT
Start: 2024-01-05

## 2024-01-05 RX ORDER — METHYLERGONOVINE MALEATE 0.2 MG/ML
0.2 INJECTION INTRAVENOUS
Status: DISCONTINUED | OUTPATIENT
Start: 2024-01-05 | End: 2024-01-06 | Stop reason: HOSPADM

## 2024-01-05 RX ORDER — METHYLPREDNISOLONE SODIUM SUCCINATE 125 MG/2ML
125 INJECTION, POWDER, LYOPHILIZED, FOR SOLUTION INTRAMUSCULAR; INTRAVENOUS ONCE
Status: COMPLETED | OUTPATIENT
Start: 2024-01-05 | End: 2024-01-05

## 2024-01-05 RX ORDER — SODIUM CHLORIDE, SODIUM LACTATE, POTASSIUM CHLORIDE, CALCIUM CHLORIDE 600; 310; 30; 20 MG/100ML; MG/100ML; MG/100ML; MG/100ML
INJECTION, SOLUTION INTRAVENOUS CONTINUOUS
Status: DISCONTINUED | OUTPATIENT
Start: 2024-01-05 | End: 2024-01-06 | Stop reason: HOSPADM

## 2024-01-05 RX ORDER — DIPHENHYDRAMINE HYDROCHLORIDE 50 MG/ML
50 INJECTION INTRAMUSCULAR; INTRAVENOUS PRN
OUTPATIENT
Start: 2024-01-05

## 2024-01-05 RX ORDER — ONDANSETRON 4 MG/1
4 TABLET, ORALLY DISINTEGRATING ORAL EVERY 4 HOURS PRN
Status: DISCONTINUED | OUTPATIENT
Start: 2024-01-05 | End: 2024-01-06 | Stop reason: HOSPADM

## 2024-01-05 RX ORDER — OXYTOCIN 10 [USP'U]/ML
10 INJECTION, SOLUTION INTRAMUSCULAR; INTRAVENOUS
Status: DISCONTINUED | OUTPATIENT
Start: 2024-01-05 | End: 2024-01-06 | Stop reason: HOSPADM

## 2024-01-05 RX ORDER — SODIUM CHLORIDE 9 MG/ML
INJECTION, SOLUTION INTRAVENOUS CONTINUOUS
Status: CANCELLED | OUTPATIENT
Start: 2024-01-05

## 2024-01-05 RX ORDER — ONDANSETRON 4 MG/1
4 TABLET, ORALLY DISINTEGRATING ORAL EVERY 6 HOURS PRN
Status: DISCONTINUED | OUTPATIENT
Start: 2024-01-05 | End: 2024-01-05

## 2024-01-05 RX ORDER — LIDOCAINE HYDROCHLORIDE 10 MG/ML
20 INJECTION, SOLUTION INFILTRATION; PERINEURAL
Status: CANCELLED | OUTPATIENT
Start: 2024-01-05

## 2024-01-05 RX ORDER — ONDANSETRON 2 MG/ML
4 INJECTION INTRAMUSCULAR; INTRAVENOUS EVERY 6 HOURS PRN
Status: DISCONTINUED | OUTPATIENT
Start: 2024-01-05 | End: 2024-01-06 | Stop reason: HOSPADM

## 2024-01-05 RX ADMIN — HYDROXYZINE HYDROCHLORIDE 25 MG: 25 TABLET, FILM COATED ORAL at 05:09

## 2024-01-05 RX ADMIN — SODIUM CHLORIDE: 9 INJECTION, SOLUTION INTRAVENOUS at 19:29

## 2024-01-05 RX ADMIN — SODIUM CHLORIDE 1000 MG: 9 INJECTION, SOLUTION INTRAVENOUS at 19:44

## 2024-01-05 RX ADMIN — CEFAZOLIN 1 G: 1 INJECTION, POWDER, FOR SOLUTION INTRAMUSCULAR; INTRAVENOUS at 11:26

## 2024-01-05 RX ADMIN — CEFAZOLIN 1 G: 1 INJECTION, POWDER, FOR SOLUTION INTRAMUSCULAR; INTRAVENOUS at 18:42

## 2024-01-05 RX ADMIN — NIFEDIPINE 10 MG: 10 CAPSULE ORAL at 02:24

## 2024-01-05 RX ADMIN — METHYLPREDNISOLONE SODIUM SUCCINATE 125 MG: 125 INJECTION, POWDER, FOR SOLUTION INTRAMUSCULAR; INTRAVENOUS at 19:34

## 2024-01-05 RX ADMIN — CEFAZOLIN 2 G: 2 INJECTION, POWDER, FOR SOLUTION INTRAMUSCULAR; INTRAVENOUS at 03:15

## 2024-01-05 RX ADMIN — ONDANSETRON 4 MG: 4 TABLET, ORALLY DISINTEGRATING ORAL at 08:50

## 2024-01-05 RX ADMIN — NIFEDIPINE 10 MG: 10 CAPSULE ORAL at 20:13

## 2024-01-05 RX ADMIN — BETAMETHASONE SODIUM PHOSPHATE AND BETAMETHASONE ACETATE 12 MG: 3; 3 INJECTION, SUSPENSION INTRA-ARTICULAR; INTRALESIONAL; INTRAMUSCULAR at 08:08

## 2024-01-05 RX ADMIN — SODIUM CHLORIDE, POTASSIUM CHLORIDE, SODIUM LACTATE AND CALCIUM CHLORIDE: 600; 310; 30; 20 INJECTION, SOLUTION INTRAVENOUS at 03:14

## 2024-01-05 RX ADMIN — NIFEDIPINE 10 MG: 10 CAPSULE ORAL at 08:06

## 2024-01-05 RX ADMIN — SODIUM CHLORIDE, POTASSIUM CHLORIDE, SODIUM LACTATE AND CALCIUM CHLORIDE: 600; 310; 30; 20 INJECTION, SOLUTION INTRAVENOUS at 21:01

## 2024-01-05 RX ADMIN — NIFEDIPINE 10 MG: 10 CAPSULE ORAL at 14:18

## 2024-01-05 RX ADMIN — SODIUM CHLORIDE, POTASSIUM CHLORIDE, SODIUM LACTATE AND CALCIUM CHLORIDE: 600; 310; 30; 20 INJECTION, SOLUTION INTRAVENOUS at 16:24

## 2024-01-05 ASSESSMENT — PAIN DESCRIPTION - PAIN TYPE
TYPE: ACUTE PAIN

## 2024-01-05 ASSESSMENT — FIBROSIS 4 INDEX: FIB4 SCORE: 0.95

## 2024-01-05 NOTE — H&P
DATE OF ADMISSION:  2024     ADMIT HISTORY AND PHYSICAL     IDENTIFICATION:  This is a 33-year-old  6, para 2-1-2-2, with an EDC of   2024 and EGA of 33 weeks 5 days, who presents with complaints of   painful contractions.     HISTORY OF PRESENT ILLNESS:  This is a patient of Dr. Jauregui's who has had   prenatal care.  Of note, she had an 18-week loss recently prior to this   pregnancy.  She has been followed by perinatology for shortened cervix.  She   does not have a cerclage in place.  She was taking vaginal progesterone until   about 4 weeks ago when she decided it was making her vagina sore and she   stopped using it.  She was seen for a routine prenatal visit yesterday morning   and notified Dr. Jauregui that she had stopped her vaginal progesterone and   was complaining of some contractions and therefore, her cervix was checked in   the office and she was found to be 4 cm per the patient.  She reports that   after the exam, she started having more contractions.  She did get a dose of   betamethasone in the office around 8:15 yesterday morning.  Contractions got   more intense through the evening and until early this morning, which is what   presented her to present here. She denies leaking of fluid or vaginal   bleeding.  She reports the baby has been moving well.  She reports Dr. Jauregui   told her the baby was head down yesterday.She had an u/s with [facility] last week with normal growth and also noted to be vertex.      OBSTETRICAL HISTORY:  Significant for 2 term vaginal deliveries, molar   pregnancy, spontaneous , an 18-week loss, and the current pregnancy.     GYNECOLOGIC HISTORY:  Denies STDs or abnormal Pap smears.     PAST MEDICAL HISTORY:  None.     ALLERGIES:  PENICILLIN AND CODEINE.  SHE GETS A RASH.     CURRENT MEDICATIONS:  Prenatal vitamins.     MEDICAL PROBLEMS:  Denied.     SURGICAL HISTORY:  None.     SOCIAL HISTORY:  She is here with her .  She denies use of  alcohol,   tobacco or drug use.     FAMILY HISTORY:  Noncontributory.     PHYSICAL EXAMINATION:  VITAL SIGNS:  The patient is afebrile.  Her blood pressures are normotensive.  GENERAL:  She is awake, alert, and overall comfortable.  HEART:  Regular rate.  RESPIRATORY:  Unlabored breathing.  ABDOMEN:  Soft, gravid.  Uterus, nontender.  PELVIC:  Sterile vaginal exam was repeated.  She was felt to be 3-4, 80, -2   station.  Fetal heart tracing baseline 130s with moderate variability.    Accelerations are present.  There are no decelerations.  Tocometry, on initial   arrival, she was gerardo every 5 minutes, now they are about every 7-10.     ASSESSMENT:  1.  A 33-year-old  at 33 weeks and 5 days.  2.   labor based on contractions with dilated   cervix more than 3 cm.  3.  History of shortened cervix; not taking vaginal progesterone  4.  Normally grown baby  5. GBS unknown       PLAN:  Will be to admit.  We will complete her betamethasone administration   with a second dose this morning.  We will give her nifedipine for tocolysis to   begin with.  She does not need magnesium for neuro protection as she is over   32 weeks.  We will collect a GBS culture and start GBS prophylaxis with Ancef   given a PENICILLIN ALLERGY. We will rest and hydrate and see how she does.  Plan of care has been discussed with the patient and her   .  They are in agreement.  Questions were answered.        ______________________________  MD JAMIR Lovett/GREER/MIAN    DD:  2024 02:20  DT:  2024 04:49    Job#:  [id number]

## 2024-01-05 NOTE — PROGRESS NOTES
"Progress Note    Subjective: reporting irregular contractions, good fm no VB or LOF    Objective Data:  Recent Labs     01/05/24  0215   WBC 11.9*   RBC 4.16*   HEMOGLOBIN 8.8*   HEMATOCRIT 29.0*   MCV 69.7*   MCH 21.2*   MCHC 30.3*   RDW 40.7   PLATELETCT 320   MPV 9.4           Vitals:    01/05/24 0100 01/05/24 0110 01/05/24 0230 01/05/24 0800   BP:  120/69 112/64 110/60   Pulse:  96 89 96   Resp: 20 16     Temp:  36.2 °C (97.2 °F)     TempSrc:  Temporal     SpO2:  98%     Weight: 116 kg (255 lb)      Height: 1.676 m (5' 6\")          WDWN, NAD    NST reveiwed by me  140 reactive and no decels, CAT 1  Ctxns Q10    Current Facility-Administered Medications   Medication Dose Route Frequency Provider Last Rate Last Admin    LR infusion   Intravenous Continuous Aubrie Alexander M.D. 125 mL/hr at 01/05/24 0314 New Bag at 01/05/24 0314    lidocaine (XYLOCAINE) 1%  injection  20 mL Subcutaneous Once PRN Aubrie Alexander M.D.        terbutaline (Brethine) injection 0.25 mg  0.25 mg Subcutaneous Once PRN Aubrie Alexander M.D.        oxytocin (Pitocin) infusion bolus (for post delivery)  20 Units Intravenous Once Aubrie Alexander M.D.   Held at 01/05/24 0230    Followed by    oxytocin (Pitocin) infusion (for post delivery)  125 mL/hr Intravenous Continuous Aubrie Alexander M.D.   Held at 01/05/24 0315    oxytocin (Pitocin) injection 10 Units  10 Units Intramuscular Once PRN Aubrie Alexander M.D.        ibuprofen (Motrin) tablet 800 mg  800 mg Oral Once PRN Aubrie Alexander M.D.        acetaminophen (Tylenol) tablet 1,000 mg  1,000 mg Oral Once PRN Aubrie Alexander M.D.        ondansetron (Zofran ODT) dispertab 4 mg  4 mg Oral Q6HRS PRN Aubrie Alexander M.D.        Or    ondansetron (Zofran) syringe/vial injection 4 mg  4 mg Intravenous Q6HRS PRN Aubrie Alexander M.D.        ceFAZolin (Ancef) 1 g in  mL IVPB  1 g Intravenous Q8HRS Aubrie Alexander M.D.        miSOPROStol (Cytotec) tablet 800 mcg  800 mcg " Rectal Once PRN Aubrie Alexander M.D.        methylergonovine (Methergine) injection 0.2 mg  0.2 mg Intramuscular Once PRN Aubrie Alexander M.D.        NIFEdipine IR (Procardia) capsule 10 mg  10 mg Oral Q6HRS Aubrie Alexander M.D.   10 mg at 01/05/24 0224    betamethasone acetate-betamethasone sodium phosphate (Celestone) injection 12 mg  12 mg Intramuscular Once Aubrie Alexander M.D.           Assessment: IUP 33 5/7 weeks  PTL  Procardia, s/p one [facility]    Plan: ROSANNA 8:45   Continue abx  Iron infusion today if possible

## 2024-01-05 NOTE — PROGRESS NOTES
0100- Pt presenting to unit for ctx. Provider called and given report, , GBS unknown, 33 & 5/7 pt with SVE of 4/80/-2. Pt received 1st betamethasone shot in office today. Pt states 7/10 ctx pain every 5 minutes. EFM/[place] applied, Pt reports positive FM and ctx. Pt declines VB, HA, LOF, PIH symptoms.  Provider to bedside to assess. Orders to start abx, fluids and monitoring.     0700- Report given to day RN. Care transferred.

## 2024-01-06 ENCOUNTER — APPOINTMENT (OUTPATIENT)
Dept: ONCOLOGY | Facility: MEDICAL CENTER | Age: 34
End: 2024-01-06
Attending: OBSTETRICS & GYNECOLOGY
Payer: MEDICAID

## 2024-01-06 VITALS
RESPIRATION RATE: 16 BRPM | HEIGHT: 66 IN | WEIGHT: 255 LBS | BODY MASS INDEX: 40.98 KG/M2 | SYSTOLIC BLOOD PRESSURE: 103 MMHG | OXYGEN SATURATION: 91 % | TEMPERATURE: 97.2 F | HEART RATE: 106 BPM | DIASTOLIC BLOOD PRESSURE: 54 MMHG

## 2024-01-06 LAB — GP B STREP DNA SPEC QL NAA+PROBE: NEGATIVE

## 2024-01-06 PROCEDURE — 700111 HCHG RX REV CODE 636 W/ 250 OVERRIDE (IP): Mod: JZ | Performed by: OBSTETRICS & GYNECOLOGY

## 2024-01-06 PROCEDURE — 700102 HCHG RX REV CODE 250 W/ 637 OVERRIDE(OP): Performed by: OBSTETRICS & GYNECOLOGY

## 2024-01-06 PROCEDURE — A9270 NON-COVERED ITEM OR SERVICE: HCPCS | Performed by: OBSTETRICS & GYNECOLOGY

## 2024-01-06 PROCEDURE — 700105 HCHG RX REV CODE 258: Performed by: OBSTETRICS & GYNECOLOGY

## 2024-01-06 PROCEDURE — 59025 FETAL NON-STRESS TEST: CPT

## 2024-01-06 RX ORDER — NIFEDIPINE 10 MG/1
10 CAPSULE ORAL EVERY 6 HOURS
Qty: 60 CAPSULE | Refills: 1 | Status: ON HOLD | OUTPATIENT
Start: 2024-01-06 | End: 2024-01-31

## 2024-01-06 RX ORDER — HYDROXYZINE HYDROCHLORIDE 25 MG/1
25 TABLET, FILM COATED ORAL
Status: DISCONTINUED | OUTPATIENT
Start: 2024-01-06 | End: 2024-01-06 | Stop reason: HOSPADM

## 2024-01-06 RX ADMIN — HYDROXYZINE HYDROCHLORIDE 25 MG: 25 TABLET, FILM COATED ORAL at 00:27

## 2024-01-06 RX ADMIN — NIFEDIPINE 10 MG: 10 CAPSULE ORAL at 06:08

## 2024-01-06 RX ADMIN — CEFAZOLIN 1 G: 1 INJECTION, POWDER, FOR SOLUTION INTRAMUSCULAR; INTRAVENOUS at 03:09

## 2024-01-06 RX ADMIN — NIFEDIPINE 10 MG: 10 CAPSULE ORAL at 00:27

## 2024-01-06 NOTE — DISCHARGE SUMMARY
DATE OF ADMISSION:  2024   DATE OF DISCHARGE:  2024     This is a private patient of Dr. Jauregui's.     ADMITTING DIAGNOSES:  1.  A 33-year-old  6, para 2 at 33 and 5/7th weeks' gestational age.  2.   labor based on contractions with dilated cervix of more than 3 cm.  3.  History of a shortened cervix, not currently taking vaginal progesterone.      DISCHARGE DIAGNOSES:  1.  A 33-year-old  6, para 2 at 33 and 5/7th weeks' gestational age.  2.   labor based on contractions with dilated cervix of more than 3 cm.  3.  History of a shortened cervix, not currently taking vaginal progesterone.  4.   labor stable and resolved.     HOSPITAL COURSE:  The patient was admitted and had received 1 dose of   betamethasone in the office.  She received her second dose yesterday morning.    She was started on nifedipine for tocolysis.  She was not started on   magnesium since she is over 32 weeks.  Group B strep culture has been   collected, which has not returned.  She has been taking the Procardia over the   last 24 hours and her uterine contractions have now stopped.  She feels the   baby moving.  Her cervix was checked again this morning after the 12-hour   steroid window and unchanged at 4 cm.  I am now going to send her home in   stable condition.  I did write a prescription for the nifedipine 10 mg tablets   to take at home every 6 hours as needed.  I instructed her to make a followup   appointment with Dr. Jauregui this week in the office.  Also, of note, the   patient was severely anemic with a hemoglobin of 8.8 and she did receive an   iron transfusion yesterday here in the hospital.        ______________________________  Corinne E. Capurro, MD CEC/GREER/    DD:  2024 10:12  DT:  2024 11:54    Job#:  [id number]

## 2024-01-06 NOTE — CARE PLAN
The patient is Stable - Low risk of patient condition declining or worsening    Shift Goals  Clinical Goals: eliminate contractions  Patient Goals: rest  Family Goals: support    Progress made toward(s) clinical / shift goals:    Problem: Risk for Infection and Impaired Wound Healing  Goal: Patient will remain free from infection  Outcome: Progressing  Patient is afebrile. VS monitored per orders.     Problem: Pain  Goal: Patient's pain will be alleviated or reduced to the patient’s comfort goal  Outcome: Progressing   Pt assessed for pain regularly and medicated PRN per MAR.

## 2024-01-06 NOTE — PROGRESS NOTES
0700 - Report from Stephania MARCELO. Assumed care of pt.     0800 - VSS, palpation of UC was mild, LR running at 125. nd betametasone dose given. Dr. Jauregui to bedside.    1220 - Pt transported to antepartum room.     1900 - Report to Senait MARCELO.

## 2024-01-06 NOTE — PROGRESS NOTES
Late entry due to patient care:  1900: Report received from Tatiana MARCELO. Plan of care discussed, care assumed at this time.   1944: Iron infusion running. Initial infusion of 75 mL/hr for 5 minutes, followed by a 10 minute pause with this RN in the room with patient. No observable signs or symptoms of reaction, therefore remainder of infusion ran at 333 mL/hr.   0014: Juventino ARGUETA notified of patient desire for sleep aid. New orders received, see MAR.   2300: UC hard to monitor due to body habitus and patient sleeping on side. Patient reports no UC o rpain at this time.   0400: Patient not feeling any contractions or pain at this time.   0700: Report given to Tatiana MARCELO. Plan of care discussed, care relinquished at this time.

## 2024-01-06 NOTE — DISCHARGE INSTRUCTIONS
General Instructions:  If you think you are in labor, time contractions (lying on your left side) from the beginning of one contraction to the beginning of the next contraction for at least one hour.  Increase fluid intake: you should consume 10-12 8 oz glasses of non-caffeinated fluid per day.  Report any pressure or burning on urination to your physician.  Monitor fetal movement: If you notice an absence or decrease in fetal movement, drink a large glass of water and rest on your side.  If there is no increase in movement, call your physician or go to the hospital for further evaluation.  Report any sudden, sharp abdominal pain.  Report any bleeding.  Spotting or pinkish discharge is normal after vaginal exam.  You may also spot after sexual intercourse.    Nifedipine Capsules  What is this medication?  NIFEDIPINE (leola FED i peen) prevents and treats chest pain (angina). It works by relaxing blood vessels, which decreases the amount of work the heart has to do. It belongs to a group of medications called calcium channel blockers.  This medicine may be used for other purposes; ask your health care provider or pharmacist if you have questions.  COMMON BRAND NAME(S): Adalat, Procardia  What should I tell my care team before I take this medication?  They need to know if you have any of these conditions:  Heart attack  Heart disease  Heart failure  High blood pressure  Low blood pressure  An unusual or allergic reaction to nifedipine, other medications, foods, dyes or preservatives  Pregnant or trying to get pregnant  Breast-feeding  How should I use this medication?  Take this medication by mouth. Take it as directed on the prescription label at the same time every day. You can take it with or without food. If it upsets your stomach, take it with food. Keep taking it unless your care team tells you to stop.  Do not take this medication with grapefruit juice.  Talk to your care team about the use of this medication in  children. Special care may be needed.  Overdosage: If you think you have taken too much of this medicine contact a poison control center or emergency room at once.  NOTE: This medicine is only for you. Do not share this medicine with others.  What if I miss a dose?  If you miss a dose, take it as soon as you can. If it is almost time for your next dose, take only that dose. Do not take double or extra doses.  What may interact with this medication?  Do not take this medication with any of the following:  Certain medications for seizures like carbamazepine, phenobarbital, phenytoin  Lumacaftor; ivacaftor  Rifabutin  Rifampin  Rifapentine  Mello's Wort  This medication may also interact with the following:  Antiviral medications for HIV or AIDS  Certain medications for blood pressure  Certain medications for diabetes  Certain medications for erectile dysfunction  Certain medications for fungal infections like ketoconazole, fluconazole, and itraconazole  Certain medications for irregular heart beat like flecainide and quinidine  Certain medications that treat or prevent blood clots like warfarin  Clarithromycin  Digoxin  Dolasetron  Erythromycin  Fluoxetine  Grapefruit juice  Local or general anesthetics  Nefazodone  Orlistat  Quinupristin; dalfopristin  Sirolimus  Stomach acid blockers like cimetidine, ranitidine, omeprazole, or pantoprazole  Tacrolimus  Valproic acid  This list may not describe all possible interactions. Give your health care provider a list of all the medicines, herbs, non-prescription drugs, or dietary supplements you use. Also tell them if you smoke, drink alcohol, or use illegal drugs. Some items may interact with your medicine.  What should I watch for while using this medication?  Visit your care team for regular checks on your progress. Check your blood pressure as directed. Ask your care team what your blood pressure should be. Also, find out when you should contact them.  Do not treat  yourself for coughs, colds, or pain while you are using this medication without asking your care team for advice. Some medications may increase your blood pressure.  You may get drowsy or dizzy. Do not drive, use machinery, or do anything that needs mental alertness until you know how this medication affects you. Do not stand up or sit up quickly, especially if you are an older patient. This reduces the risk of dizzy or fainting spells.  What side effects may I notice from receiving this medication?  Side effects that you should report to your care team as soon as possible:  Allergic reactions--skin rash, itching, hives, swelling of the face, lips, tongue, or throat  Heart attack--pain or tightness in the chest, shoulders, arms, or jaw, nausea, shortness of breath, cold or clammy skin, feeling faint or lightheaded  Heart failure--shortness of breath, swelling of the ankles, feet, or hands, sudden weight gain, unusual weakness or fatigue  Low blood pressure--dizziness, feeling faint or lightheaded, blurry vision  Worsening chest pain (angina)--pain, pressure, or tightness in the chest, neck, back, or arms  Side effects that usually do not require medical attention (report to your care team if they continue or are bothersome):  Constipation  Dizziness  Facial flushing, redness  Headache  Heart palpitations--rapid, pounding, or irregular heartbeat  Nausea  Stomach pain  This list may not describe all possible side effects. Call your doctor for medical advice about side effects. You may report side effects to FDA at 6-508-QYX-0420.  Where should I keep my medication?  Keep out of the reach of children and pets.  Store at room temperature between 15 and 25 degrees C (59 and 77 degrees F). Protect from light and moisture. Keep the container tightly closed.Throw away any unused medication after the expiration date.  NOTE: This sheet is a summary. It may not cover all possible information. If you have questions about this  medicine, talk to your doctor, pharmacist, or health care provider.  © 2023 [employer]vier/Gold Standard (2022-11-23 00:00:00)  Pre-term Labor (<37 weeks):  Call your physician or return to the hospital if:  You have painless regular contractions more than 4 in one hour.  Your water breaks (remember time and color).  You have menstrual-like cramps, a low dull backache or pressure in your pelvis or back.  Your baby does not move enough to complete the daily kick count (10 movements in 2 hours).  Your baby moves much less often than on the days before or you have not felt your baby move all day.  Please review the MEDICATION LIST section of your AFTER VISIT SUMMARY document.  Take your medication as prescribed    Other Instructions:  Please carefully review your entire AFTER VISIT SUMMARY document for all discharge instructions.

## 2024-01-06 NOTE — PROGRESS NOTES
0700- Report received from Senait RIDLEY RN. Patient resting in bed, denies needs or concerns at this time.     1015- Discharge instructions reviewed with patent and support person, verbalized understanding of PTL precautions and reasons to be evaluated. Ambulatory off the unit in stable condition.

## 2024-01-30 ENCOUNTER — HOSPITAL ENCOUNTER (INPATIENT)
Facility: MEDICAL CENTER | Age: 34
LOS: 2 days | End: 2024-02-01
Attending: OBSTETRICS & GYNECOLOGY | Admitting: OBSTETRICS & GYNECOLOGY
Payer: MEDICAID

## 2024-01-30 ENCOUNTER — ANESTHESIA EVENT (OUTPATIENT)
Dept: ANESTHESIOLOGY | Facility: MEDICAL CENTER | Age: 34
End: 2024-01-30
Payer: MEDICAID

## 2024-01-30 ENCOUNTER — ANESTHESIA (OUTPATIENT)
Dept: ANESTHESIOLOGY | Facility: MEDICAL CENTER | Age: 34
End: 2024-01-30
Payer: MEDICAID

## 2024-01-30 DIAGNOSIS — G89.18 POSTOPERATIVE PAIN: ICD-10-CM

## 2024-01-30 LAB
ABO GROUP BLD: ABNORMAL
BARCODED ABORH UBTYP: 6200
BARCODED ABORH UBTYP: 6200
BARCODED PRD CODE UBPRD: ABNORMAL
BARCODED PRD CODE UBPRD: ABNORMAL
BARCODED UNIT NUM UBUNT: ABNORMAL
BARCODED UNIT NUM UBUNT: ABNORMAL
BASOPHILS # BLD AUTO: 0.4 % (ref 0–1.8)
BASOPHILS # BLD: 0.03 K/UL (ref 0–0.12)
BLD GP AB INVEST PLASRBC-IMP: ABNORMAL
BLD GP AB SCN SERPL QL: ABNORMAL
COMPONENT R 8504R: ABNORMAL
COMPONENT R 8504R: ABNORMAL
EOSINOPHIL # BLD AUTO: 0.05 K/UL (ref 0–0.51)
EOSINOPHIL NFR BLD: 0.6 % (ref 0–6.9)
ERYTHROCYTE [DISTWIDTH] IN BLOOD BY AUTOMATED COUNT: 58.6 FL (ref 35.9–50)
HCT VFR BLD AUTO: 36.6 % (ref 37–47)
HGB BLD-MCNC: 11.6 G/DL (ref 12–16)
IMM GRANULOCYTES # BLD AUTO: 0.14 K/UL (ref 0–0.11)
IMM GRANULOCYTES NFR BLD AUTO: 1.8 % (ref 0–0.9)
LYMPHOCYTES # BLD AUTO: 1.19 K/UL (ref 1–4.8)
LYMPHOCYTES NFR BLD: 15.1 % (ref 22–41)
MCH RBC QN AUTO: 22.8 PG (ref 27–33)
MCHC RBC AUTO-ENTMCNC: 31.7 G/DL (ref 32.2–35.5)
MCV RBC AUTO: 72 FL (ref 81.4–97.8)
MONOCYTES # BLD AUTO: 0.59 K/UL (ref 0–0.85)
MONOCYTES NFR BLD AUTO: 7.5 % (ref 0–13.4)
NEUTROPHILS # BLD AUTO: 5.88 K/UL (ref 1.82–7.42)
NEUTROPHILS NFR BLD: 74.6 % (ref 44–72)
NRBC # BLD AUTO: 0 K/UL
NRBC BLD-RTO: 0 /100 WBC (ref 0–0.2)
PLATELET # BLD AUTO: 360 K/UL (ref 164–446)
PMV BLD AUTO: 9.2 FL (ref 9–12.9)
PRODUCT TYPE UPROD: ABNORMAL
PRODUCT TYPE UPROD: ABNORMAL
RBC # BLD AUTO: 5.08 M/UL (ref 4.2–5.4)
RH BLD: ABNORMAL
T PALLIDUM AB SER QL IA: NORMAL
UNIT STATUS USTAT: ABNORMAL
UNIT STATUS USTAT: ABNORMAL
WBC # BLD AUTO: 7.9 K/UL (ref 4.8–10.8)

## 2024-01-30 PROCEDURE — 700111 HCHG RX REV CODE 636 W/ 250 OVERRIDE (IP): Mod: JZ

## 2024-01-30 PROCEDURE — 86902 BLOOD TYPE ANTIGEN DONOR EA: CPT | Mod: 91

## 2024-01-30 PROCEDURE — 36415 COLL VENOUS BLD VENIPUNCTURE: CPT

## 2024-01-30 PROCEDURE — 700105 HCHG RX REV CODE 258: Performed by: OBSTETRICS & GYNECOLOGY

## 2024-01-30 PROCEDURE — 302449 STATCHG TRIAGE ONLY (STATISTIC)

## 2024-01-30 PROCEDURE — 700111 HCHG RX REV CODE 636 W/ 250 OVERRIDE (IP): Mod: JZ | Performed by: ANESTHESIOLOGY

## 2024-01-30 PROCEDURE — 86901 BLOOD TYPING SEROLOGIC RH(D): CPT

## 2024-01-30 PROCEDURE — 59409 OBSTETRICAL CARE: CPT

## 2024-01-30 PROCEDURE — 86870 RBC ANTIBODY IDENTIFICATION: CPT

## 2024-01-30 PROCEDURE — 86900 BLOOD TYPING SEROLOGIC ABO: CPT

## 2024-01-30 PROCEDURE — 700105 HCHG RX REV CODE 258: Performed by: ANESTHESIOLOGY

## 2024-01-30 PROCEDURE — 85025 COMPLETE CBC W/AUTO DIFF WBC: CPT

## 2024-01-30 PROCEDURE — 304965 HCHG RECOVERY SERVICES

## 2024-01-30 PROCEDURE — 303615 HCHG EPIDURAL/SPINAL ANESTHESIA FOR LABOR

## 2024-01-30 PROCEDURE — 86780 TREPONEMA PALLIDUM: CPT

## 2024-01-30 PROCEDURE — 86922 COMPATIBILITY TEST ANTIGLOB: CPT | Mod: 91

## 2024-01-30 PROCEDURE — 86850 RBC ANTIBODY SCREEN: CPT

## 2024-01-30 PROCEDURE — 770002 HCHG ROOM/CARE - OB PRIVATE (112)

## 2024-01-30 PROCEDURE — 700111 HCHG RX REV CODE 636 W/ 250 OVERRIDE (IP): Mod: JZ | Performed by: OBSTETRICS & GYNECOLOGY

## 2024-01-30 RX ORDER — IBUPROFEN 800 MG/1
800 TABLET ORAL
Status: DISCONTINUED | OUTPATIENT
Start: 2024-01-30 | End: 2024-01-30 | Stop reason: HOSPADM

## 2024-01-30 RX ORDER — ROPIVACAINE HYDROCHLORIDE 2 MG/ML
INJECTION, SOLUTION EPIDURAL; INFILTRATION; PERINEURAL CONTINUOUS
Status: DISCONTINUED | OUTPATIENT
Start: 2024-01-30 | End: 2024-01-31 | Stop reason: HOSPADM

## 2024-01-30 RX ORDER — ACETAMINOPHEN 500 MG
1000 TABLET ORAL
Status: DISCONTINUED | OUTPATIENT
Start: 2024-01-30 | End: 2024-01-30 | Stop reason: HOSPADM

## 2024-01-30 RX ORDER — ALUMINA, MAGNESIA, AND SIMETHICONE 2400; 2400; 240 MG/30ML; MG/30ML; MG/30ML
30 SUSPENSION ORAL EVERY 6 HOURS PRN
Status: DISCONTINUED | OUTPATIENT
Start: 2024-01-30 | End: 2024-01-30 | Stop reason: HOSPADM

## 2024-01-30 RX ORDER — HYDROXYZINE 50 MG/1
50 TABLET, FILM COATED ORAL EVERY 6 HOURS PRN
Status: DISCONTINUED | OUTPATIENT
Start: 2024-01-30 | End: 2024-01-30 | Stop reason: HOSPADM

## 2024-01-30 RX ORDER — CARBOPROST TROMETHAMINE 250 UG/ML
250 INJECTION, SOLUTION INTRAMUSCULAR
Status: DISCONTINUED | OUTPATIENT
Start: 2024-01-30 | End: 2024-01-30 | Stop reason: HOSPADM

## 2024-01-30 RX ORDER — ROPIVACAINE HYDROCHLORIDE 2 MG/ML
INJECTION, SOLUTION EPIDURAL; INFILTRATION; PERINEURAL CONTINUOUS
Status: DISCONTINUED | OUTPATIENT
Start: 2024-01-30 | End: 2024-01-30

## 2024-01-30 RX ORDER — SODIUM CHLORIDE, SODIUM LACTATE, POTASSIUM CHLORIDE, AND CALCIUM CHLORIDE .6; .31; .03; .02 G/100ML; G/100ML; G/100ML; G/100ML
250 INJECTION, SOLUTION INTRAVENOUS PRN
Status: DISCONTINUED | OUTPATIENT
Start: 2024-01-30 | End: 2024-01-30 | Stop reason: HOSPADM

## 2024-01-30 RX ORDER — ONDANSETRON 4 MG/1
4 TABLET, ORALLY DISINTEGRATING ORAL EVERY 6 HOURS PRN
Status: DISCONTINUED | OUTPATIENT
Start: 2024-01-30 | End: 2024-01-30 | Stop reason: HOSPADM

## 2024-01-30 RX ORDER — TERBUTALINE SULFATE 1 MG/ML
0.25 INJECTION, SOLUTION SUBCUTANEOUS
Status: DISCONTINUED | OUTPATIENT
Start: 2024-01-30 | End: 2024-01-30 | Stop reason: HOSPADM

## 2024-01-30 RX ORDER — SODIUM CHLORIDE, SODIUM LACTATE, POTASSIUM CHLORIDE, AND CALCIUM CHLORIDE .6; .31; .03; .02 G/100ML; G/100ML; G/100ML; G/100ML
1000 INJECTION, SOLUTION INTRAVENOUS
Status: COMPLETED | OUTPATIENT
Start: 2024-01-30 | End: 2024-01-30

## 2024-01-30 RX ORDER — SODIUM CHLORIDE, SODIUM LACTATE, POTASSIUM CHLORIDE, CALCIUM CHLORIDE 600; 310; 30; 20 MG/100ML; MG/100ML; MG/100ML; MG/100ML
INJECTION, SOLUTION INTRAVENOUS CONTINUOUS
Status: DISCONTINUED | OUTPATIENT
Start: 2024-01-30 | End: 2024-01-31 | Stop reason: HOSPADM

## 2024-01-30 RX ORDER — METHYLERGONOVINE MALEATE 0.2 MG/ML
0.2 INJECTION INTRAVENOUS
Status: DISCONTINUED | OUTPATIENT
Start: 2024-01-30 | End: 2024-01-30 | Stop reason: HOSPADM

## 2024-01-30 RX ORDER — EPHEDRINE SULFATE 50 MG/ML
5 INJECTION, SOLUTION INTRAVENOUS
Status: DISCONTINUED | OUTPATIENT
Start: 2024-01-30 | End: 2024-01-30 | Stop reason: HOSPADM

## 2024-01-30 RX ORDER — OXYTOCIN 10 [USP'U]/ML
10 INJECTION, SOLUTION INTRAMUSCULAR; INTRAVENOUS
Status: DISCONTINUED | OUTPATIENT
Start: 2024-01-30 | End: 2024-01-30 | Stop reason: HOSPADM

## 2024-01-30 RX ORDER — BUPIVACAINE HYDROCHLORIDE 2.5 MG/ML
INJECTION, SOLUTION EPIDURAL; INFILTRATION; INTRACAUDAL PRN
Status: DISCONTINUED | OUTPATIENT
Start: 2024-01-30 | End: 2024-01-30 | Stop reason: SURG

## 2024-01-30 RX ORDER — ROPIVACAINE HYDROCHLORIDE 2 MG/ML
INJECTION, SOLUTION EPIDURAL; INFILTRATION; PERINEURAL
Status: COMPLETED
Start: 2024-01-30 | End: 2024-01-30

## 2024-01-30 RX ORDER — ONDANSETRON 2 MG/ML
4 INJECTION INTRAMUSCULAR; INTRAVENOUS EVERY 6 HOURS PRN
Status: DISCONTINUED | OUTPATIENT
Start: 2024-01-30 | End: 2024-01-30 | Stop reason: HOSPADM

## 2024-01-30 RX ORDER — BUPIVACAINE HYDROCHLORIDE 2.5 MG/ML
INJECTION, SOLUTION EPIDURAL; INFILTRATION; INTRACAUDAL
Status: COMPLETED
Start: 2024-01-30 | End: 2024-01-30

## 2024-01-30 RX ORDER — LIDOCAINE HYDROCHLORIDE 10 MG/ML
20 INJECTION, SOLUTION INFILTRATION; PERINEURAL
Status: DISCONTINUED | OUTPATIENT
Start: 2024-01-30 | End: 2024-01-30 | Stop reason: HOSPADM

## 2024-01-30 RX ORDER — MISOPROSTOL 200 UG/1
800 TABLET ORAL
Status: DISCONTINUED | OUTPATIENT
Start: 2024-01-30 | End: 2024-01-30 | Stop reason: HOSPADM

## 2024-01-30 RX ADMIN — OXYTOCIN 125 ML/HR: 10 INJECTION, SOLUTION INTRAMUSCULAR; INTRAVENOUS at 22:36

## 2024-01-30 RX ADMIN — FENTANYL CITRATE 100 MCG: 50 INJECTION, SOLUTION INTRAMUSCULAR; INTRAVENOUS at 12:18

## 2024-01-30 RX ADMIN — OXYTOCIN 125 ML/HR: 10 INJECTION, SOLUTION INTRAMUSCULAR; INTRAVENOUS at 21:25

## 2024-01-30 RX ADMIN — OXYTOCIN 20 UNITS: 10 INJECTION, SOLUTION INTRAMUSCULAR; INTRAVENOUS at 20:13

## 2024-01-30 RX ADMIN — SODIUM CHLORIDE, POTASSIUM CHLORIDE, SODIUM LACTATE AND CALCIUM CHLORIDE: 600; 310; 30; 20 INJECTION, SOLUTION INTRAVENOUS at 15:45

## 2024-01-30 RX ADMIN — FENTANYL CITRATE 100 MCG: 50 INJECTION, SOLUTION INTRAMUSCULAR; INTRAVENOUS at 16:57

## 2024-01-30 RX ADMIN — OXYTOCIN 2 MILLI-UNITS/MIN: 10 INJECTION, SOLUTION INTRAMUSCULAR; INTRAVENOUS at 14:44

## 2024-01-30 RX ADMIN — SODIUM CHLORIDE, POTASSIUM CHLORIDE, SODIUM LACTATE AND CALCIUM CHLORIDE 1000 ML: 600; 310; 30; 20 INJECTION, SOLUTION INTRAVENOUS at 11:30

## 2024-01-30 RX ADMIN — ROPIVACAINE HYDROCHLORIDE 200 MG: 2 INJECTION, SOLUTION EPIDURAL; INFILTRATION at 12:16

## 2024-01-30 RX ADMIN — ROPIVACAINE HYDROCHLORIDE: 2 INJECTION, SOLUTION EPIDURAL; INFILTRATION at 16:22

## 2024-01-30 RX ADMIN — ONDANSETRON 4 MG: 2 INJECTION INTRAMUSCULAR; INTRAVENOUS at 10:42

## 2024-01-30 RX ADMIN — BUPIVACAINE HYDROCHLORIDE 5 ML: 2.5 INJECTION, SOLUTION EPIDURAL; INFILTRATION; INTRACAUDAL at 12:18

## 2024-01-30 RX ADMIN — SODIUM CHLORIDE, POTASSIUM CHLORIDE, SODIUM LACTATE AND CALCIUM CHLORIDE: 600; 310; 30; 20 INJECTION, SOLUTION INTRAVENOUS at 13:35

## 2024-01-30 RX ADMIN — BUPIVACAINE HYDROCHLORIDE 5 ML: 2.5 INJECTION, SOLUTION EPIDURAL; INFILTRATION; INTRACAUDAL at 16:57

## 2024-01-30 ASSESSMENT — LIFESTYLE VARIABLES
CONSUMPTION TOTAL: NEGATIVE
EVER_SMOKED: NEVER
HAVE YOU EVER FELT YOU SHOULD CUT DOWN ON YOUR DRINKING: NO
DOES PATIENT WANT TO STOP DRINKING: NO
TOTAL SCORE: 0
AVERAGE NUMBER OF DAYS PER WEEK YOU HAVE A DRINK CONTAINING ALCOHOL: 0
TOTAL SCORE: 0
TOTAL SCORE: 0
EVER FELT BAD OR GUILTY ABOUT YOUR DRINKING: NO
HAVE PEOPLE ANNOYED YOU BY CRITICIZING YOUR DRINKING: NO
EVER HAD A DRINK FIRST THING IN THE MORNING TO STEADY YOUR NERVES TO GET RID OF A HANGOVER: NO
ALCOHOL_USE: NO
HOW MANY TIMES IN THE PAST YEAR HAVE YOU HAD 5 OR MORE DRINKS IN A DAY: 0
ON A TYPICAL DAY WHEN YOU DRINK ALCOHOL HOW MANY DRINKS DO YOU HAVE: 0

## 2024-01-30 ASSESSMENT — PAIN SCALES - GENERAL: PAIN_LEVEL: 1

## 2024-01-30 ASSESSMENT — PATIENT HEALTH QUESTIONNAIRE - PHQ9
SUM OF ALL RESPONSES TO PHQ9 QUESTIONS 1 AND 2: 0
2. FEELING DOWN, DEPRESSED, IRRITABLE, OR HOPELESS: NOT AT ALL

## 2024-01-30 ASSESSMENT — PAIN DESCRIPTION - PAIN TYPE
TYPE: ACUTE PAIN
TYPE: ACUTE PAIN

## 2024-01-30 ASSESSMENT — FIBROSIS 4 INDEX: FIB4 SCORE: 0.87

## 2024-01-30 NOTE — PROGRESS NOTES
EDC -  EGA - 37-2    0744 - Pt arrived to labor and delivery for contractions. Pt placed in room LDA1.  0755 - External monitors in place X2. Category I FHT at this time. VSS. Pt reports good FM. No complaints of ROM or vaginal bleeding. SVE 4-/-2. FOB at bedside. POC discussed with pt and family members, all questions answered.   0830 - Pt instructed to ambulate unit and return to room at 0910 for cervical re-examination. All questions answered.   0920 - SVE as noted. Report given to Dr Peterson. Admit orders received.   0930 - Report given. POC discussed.

## 2024-01-30 NOTE — H&P
DATE OF ADMISSION:  2024     This is a private patient of Dr. Ledbetter.     HISTORY OF PRESENT ILLNESS:  She is a 33-year-old female  6, para 2 at   37 and 2/7 weeks' gestational age.  She presents to the hospital with   complaints of contractions.  She was approximately 4-5 cm and walked for an   hour and is now about 5-6 cm.  She is currently being admitted and having an   epidural placed.  Pregnancy has been complicated by low iron levels and she   has undergone iron transfusions and her hemoglobin is now up to 11.8.     Her blood type is A positive.  She is rubella immune.  She is group B strep   negative.     PHYSICAL EXAMINATION:    VITAL SIGNS:  Stable on admission.  CARDIOVASCULAR:  Regular rate and rhythm.  LUNGS:  Clear.  ABDOMEN:  Gravid.  PELVIC:  Vaginal exam as stated above 5-6 cm per nursing staff.  Fetal heart   tones are 140s and category 1, reactive, and tocometry is irregular.     ASSESSMENT AND PLAN:  This is an intrauterine pregnancy at 37 and 2/7 weeks in   active labor.  She is having an epidural placed and the plan after that is to   perform artificial rupture of membranes.  A vaginal delivery is expected.        ______________________________  Corinne E. Capurro, MD CEC/MYA    DD:  2024 12:02  DT:  2024 12:08    Job#:  [id number]

## 2024-01-30 NOTE — ANESTHESIA PREPROCEDURE EVALUATION
Date: 24  Procedure: Labor Epidural         Relevant Problems   ANESTHESIA (within normal limits)      PULMONARY (within normal limits)      NEURO (within normal limits)      CARDIAC (within normal limits)      GI (within normal limits)       (within normal limits)      ENDO (within normal limits)      OB   (positive)  labor in third trimester without delivery       Physical Exam    Airway   Mallampati: II  TM distance: >3 FB  Neck ROM: full       Cardiovascular - normal exam  Rhythm: regular  Rate: normal  (-) murmur     Dental - normal exam           Pulmonary - normal exam  Breath sounds clear to auscultation     Abdominal    Neurological - normal exam                   Anesthesia Plan    ASA 2       Plan - epidural   Neuraxial block will be labor analgesia                  Pertinent diagnostic labs and testing reviewed    Informed Consent:    Anesthetic plan and risks discussed with patient.

## 2024-01-30 NOTE — PROGRESS NOTES
0930: Report received from Aundrea MARCELO. Pt transferred to labor room. Monitors applied x2. Pt reports good FM, contractions. Pt denies VB and LOF.   0950: PIV started, labs sent.   1005: Report given to Bello MARCELO.

## 2024-01-30 NOTE — ANESTHESIA PROCEDURE NOTES
Epidural Block    Date/Time: 1/30/2024 12:10 PM    Performed by: Urbano Reddy M.D.  Authorized by: Urbano Reddy M.D.    Patient Location:  OB  Start Time:  1/30/2024 12:10 PM  End Time:  1/30/2024 12:18 PM  Reason for Block: labor analgesia    patient identified, IV checked, site marked, risks and benefits discussed, surgical consent, monitors and equipment checked and pre-op evaluation    Patient Position:  Sitting  Prep: ChloraPrep, patient draped and sterile technique    Monitoring:  Blood pressure, continuous pulse oximetry and heart rate  Approach:  Midline  Location:  L3-L4  Injection Technique:  SHEREE saline  Skin infiltration:  Lidocaine  Strength:  1%  Dose:  3ml  Needle Type:  Tuohy  Needle Gauge:  17 G  Needle Length:  3.5 in  Loss of resistance::  8  Catheter Size:  19 G  Catheter at Skin Depth:  13  Test Dose Result:  Negative

## 2024-01-30 NOTE — PROGRESS NOTES
"1005 Report received from DAVIAN Marte. Care assumed    1210 MD Barry at bedside for epidural. Pt tolerated well, test dose negative.    1238 Pt states feeling \"weird\" post epidural. VSS pt states baseline systolic BP's in low 90's, FHT reassuring.     1320 Pt states feeling a little better, but noted itchiness, education provided on epidural side effects. VSS.    1505 Bedside report given to DAVIAN Woods. Care relinquished.   "

## 2024-01-31 ENCOUNTER — PHARMACY VISIT (OUTPATIENT)
Dept: PHARMACY | Facility: MEDICAL CENTER | Age: 34
End: 2024-01-31
Payer: COMMERCIAL

## 2024-01-31 LAB
ERYTHROCYTE [DISTWIDTH] IN BLOOD BY AUTOMATED COUNT: 58.5 FL (ref 35.9–50)
HCT VFR BLD AUTO: 33.3 % (ref 37–47)
HGB BLD-MCNC: 10.6 G/DL (ref 12–16)
MCH RBC QN AUTO: 23.1 PG (ref 27–33)
MCHC RBC AUTO-ENTMCNC: 31.8 G/DL (ref 32.2–35.5)
MCV RBC AUTO: 72.5 FL (ref 81.4–97.8)
PLATELET # BLD AUTO: 290 K/UL (ref 164–446)
PMV BLD AUTO: 9.2 FL (ref 9–12.9)
RBC # BLD AUTO: 4.59 M/UL (ref 4.2–5.4)
WBC # BLD AUTO: 9.7 K/UL (ref 4.8–10.8)

## 2024-01-31 PROCEDURE — 700102 HCHG RX REV CODE 250 W/ 637 OVERRIDE(OP): Performed by: OBSTETRICS & GYNECOLOGY

## 2024-01-31 PROCEDURE — RXMED WILLOW AMBULATORY MEDICATION CHARGE: Performed by: OBSTETRICS & GYNECOLOGY

## 2024-01-31 PROCEDURE — 770002 HCHG ROOM/CARE - OB PRIVATE (112)

## 2024-01-31 PROCEDURE — 36415 COLL VENOUS BLD VENIPUNCTURE: CPT

## 2024-01-31 PROCEDURE — A9270 NON-COVERED ITEM OR SERVICE: HCPCS | Performed by: OBSTETRICS & GYNECOLOGY

## 2024-01-31 PROCEDURE — 85027 COMPLETE CBC AUTOMATED: CPT

## 2024-01-31 RX ORDER — OXYCODONE HYDROCHLORIDE 5 MG/1
5 TABLET ORAL EVERY 4 HOURS PRN
Status: DISCONTINUED | OUTPATIENT
Start: 2024-01-31 | End: 2024-02-01 | Stop reason: HOSPADM

## 2024-01-31 RX ORDER — IBUPROFEN 800 MG/1
800 TABLET ORAL EVERY 8 HOURS PRN
Qty: 30 TABLET | Refills: 1 | Status: SHIPPED | OUTPATIENT
Start: 2024-01-31

## 2024-01-31 RX ORDER — SODIUM CHLORIDE, SODIUM LACTATE, POTASSIUM CHLORIDE, CALCIUM CHLORIDE 600; 310; 30; 20 MG/100ML; MG/100ML; MG/100ML; MG/100ML
INJECTION, SOLUTION INTRAVENOUS PRN
Status: DISCONTINUED | OUTPATIENT
Start: 2024-01-31 | End: 2024-02-01 | Stop reason: HOSPADM

## 2024-01-31 RX ORDER — SIMETHICONE 125 MG
125 TABLET,CHEWABLE ORAL 4 TIMES DAILY PRN
Status: DISCONTINUED | OUTPATIENT
Start: 2024-01-31 | End: 2024-02-01 | Stop reason: HOSPADM

## 2024-01-31 RX ORDER — ACETAMINOPHEN 500 MG
1000 TABLET ORAL EVERY 6 HOURS PRN
Status: DISCONTINUED | OUTPATIENT
Start: 2024-01-31 | End: 2024-02-01 | Stop reason: HOSPADM

## 2024-01-31 RX ORDER — DOCUSATE SODIUM 100 MG/1
100 CAPSULE, LIQUID FILLED ORAL 2 TIMES DAILY PRN
Status: DISCONTINUED | OUTPATIENT
Start: 2024-01-31 | End: 2024-02-01 | Stop reason: HOSPADM

## 2024-01-31 RX ORDER — MISOPROSTOL 200 UG/1
600 TABLET ORAL
Status: DISCONTINUED | OUTPATIENT
Start: 2024-01-31 | End: 2024-02-01 | Stop reason: HOSPADM

## 2024-01-31 RX ORDER — IBUPROFEN 800 MG/1
800 TABLET ORAL EVERY 8 HOURS PRN
Status: DISCONTINUED | OUTPATIENT
Start: 2024-01-31 | End: 2024-02-01 | Stop reason: HOSPADM

## 2024-01-31 RX ADMIN — IBUPROFEN 800 MG: 800 TABLET, FILM COATED ORAL at 21:46

## 2024-01-31 RX ADMIN — IBUPROFEN 800 MG: 800 TABLET, FILM COATED ORAL at 00:34

## 2024-01-31 RX ADMIN — ACETAMINOPHEN 1000 MG: 500 TABLET ORAL at 00:34

## 2024-01-31 RX ADMIN — ACETAMINOPHEN 1000 MG: 500 TABLET ORAL at 10:05

## 2024-01-31 RX ADMIN — IBUPROFEN 800 MG: 800 TABLET, FILM COATED ORAL at 08:38

## 2024-01-31 RX ADMIN — ACETAMINOPHEN 1000 MG: 500 TABLET ORAL at 16:58

## 2024-01-31 ASSESSMENT — PAIN DESCRIPTION - PAIN TYPE
TYPE: ACUTE PAIN

## 2024-01-31 NOTE — ANESTHESIA POSTPROCEDURE EVALUATION
Patient: Martha Rubio    Procedure Summary       Date: 01/30/24 Room / Location:     Anesthesia Start: 1206 Anesthesia Stop: 1934    Procedure: Labor Epidural Diagnosis:     Scheduled Providers:  Responsible Provider: Urbano Reddy M.D.    Anesthesia Type: epidural ASA Status: 2            Final Anesthesia Type: epidural  Last vitals  BP   Blood Pressure: 100/53    Temp   36.4 °C (97.5 °F)    Pulse   (!) 103   Resp   18    SpO2   95 %      Anesthesia Post Evaluation    Patient location during evaluation: bedside  Pain score: 1                No notable events documented.     Nurse Pain Score: 1 (NPRS)

## 2024-01-31 NOTE — CARE PLAN
The patient is Stable - Low risk of patient condition declining or worsening    Shift Goals  Clinical Goals: Successful vaginal delivery with healthy mom and baby.  Patient Goals: Healthy mom and baby.  Family Goals: Emotional Support.

## 2024-01-31 NOTE — DISCHARGE SUMMARY
"Obstetrics Discharge Summary    Admission Date: 2024     Discharge Date: 2024      ADMISSION DIAGNOSIS:  1. 34yo  @ 37w2d  2. Labor  3. Anemia with low iron and hx of iron infusions    DISCHARGE DIAGNOSIS:  1. S/p   2. Anemia with low iron and hx of iron infusions       DETAILS OF HOSPITAL STAY  Presenting Problem/History of Present Illness: labor    Hospital Course:  The patient is a 33 y.o. , who presented to [facility] at 37w2d with a chief complaint of labor. She had prenatal care with [facility] with Dr. Jauregui.  Her pregnancy was complicated by anemia that required iron infusions in pregnancy. She had an . Complications with delivery: none. Estimated blood loss was 100 ml. The patient delivered a viable male infant weighing [id number] with Apgars as below in delivery summary.      The patient’s recovery and postpartum course were unremarkable. By postpartum day #2 patient met all appropriate milestones and was stable to be discharged to home.    APGARs:      Martha Rubio Pending [[id number]]         Kelli Rubio [[id number]]   8        Martha Rubio Pending [[id number]]         Kelli Rubio Boy [[id number]]   9      COMPLICATIONS: None.    PHYSICAL EXAM:  Vitals: /62   Pulse 79   Temp 36.7 °C (98 °F) (Temporal)   Resp 18   Ht 1.676 m (5' 6\")   Wt 112 kg (248 lb)   SpO2 96%   General: Alert, conversational, pleasant, no acute distress  ABD: Soft, non-tender, non-distended, fundus firm, non-tender, below the umbilicus  : Deferred  Extremities: Moves all, trace edema       RH status: A positive  Rubella Status: immune  GBS: negative     LABS/STUDIES:   Recent Labs     24  0950 24  0501   WBC 7.9 9.7   RBC 5.08 4.59   HEMOGLOBIN 11.6* 10.6*   HEMATOCRIT 36.6* 33.3*   MCV 72.0* 72.5*   MCH 22.8* 23.1*   RDW 58.6* 58.5*   PLATELETCT 360 290   MPV 9.2 9.2   NEUTSPOLYS 74.60*  --    LYMPHOCYTES 15.10*  --    MONOCYTES 7.50  --  "   EOSINOPHILS 0.60  --    BASOPHILS 0.40  --          DISPOSITION: Home.    DISCHARGE MEDICATIONS:  .  Current Outpatient Medications   Medication Sig Dispense Refill    ibuprofen (MOTRIN) 800 MG Tab Take 1 Tablet by mouth every 8 hours as needed for Moderate Pain. 30 Tablet 1         DISCHARGE INSTRUCTIONS:  1. Pelvic rest for 6 weeks postpartum.   2. Postpartum visit in 6 weeks at [facility] [phone].  3. Return to the emergency department if experiencing increased vaginal bleeding, severe pain, temperature greater than 100.4, or any other concerns.    DISCHARGE CONDITION: Stable.    Elvi Carrasquillo MD

## 2024-01-31 NOTE — L&D DELIVERY NOTE
DATE OF SERVICE:  01/30/2024     This is a private patient of Dr. Jauregui.  She underwent normal spontaneous   vaginal delivery of a viable male infant over intact perineum with epidural   placement.  Spontaneous vaginal delivery of the head in controlled sterile   fashion.  Nuchal cord x2 was noted and these were reduced at the perineum.    The rest of infant delivered through uncomplicated.  Cord was held for 30   seconds, then clamped x2 and cut.  Infant placed on mother's chest.    Spontaneous vaginal delivery of intact placenta with 3-vessel cord.  No   vaginal or perineal lacerations were noted.  Fundus firm with Pitocin and   massage.  Total estimated blood loss was approximately 100 mL.  Mother and   infant were both stable at the end of delivery.        ______________________________  Corinne E. Capurro, MD CEC/NIS    DD:  01/30/2024 19:50  DT:  01/30/2024 19:58    Job#:  [id number]

## 2024-01-31 NOTE — PROGRESS NOTES
PPD#1  S) pt without c/o  O) vss  Fundus: firm  Ext: no edema  Hgb: 10.6  A/P PPD#1 s/p    - stable, desires d/c home

## 2024-01-31 NOTE — ANESTHESIA TIME REPORT
Anesthesia Start and Stop Event Times       Date Time Event    1/30/2024 1026 Ready for Procedure     1206 Anesthesia Start     1934 Anesthesia Stop          Responsible Staff  01/30/24      Name Role Begin End    Urbano Reddy M.D. Anesth 1206 1934          Overtime Reason:  no overtime (within assigned shift)    Comments:

## 2024-01-31 NOTE — CARE PLAN
The patient is Stable - Low risk of patient condition declining or worsening    Shift Goals  Clinical Goals: pain control  Patient Goals:   Family Goals:     Progress made toward(s) clinical / shift goals:  Pt able to ambulate, care for self and infant. Pt states her pain is controlled with prn pain medication. Pt's pain reassessed throughout the shift.         Problem: Knowledge Deficit - Postpartum  Goal: Patient will verbalize and demonstrate understanding of self and infant care  Outcome: Progressing     Problem: Psychosocial - Postpartum  Goal: Patient will verbalize and demonstrate effective bonding and parenting behavior  Outcome: Progressing     Problem: Altered Physiologic Condition  Goal: Patient physiologically stable as evidenced by normal lochia, palpable uterine involution and vitals within normal limits  Outcome: Progressing     Problem: Infection - Postpartum  Goal: Postpartum patient will be free of signs and symptoms of infection  Outcome: Progressing     Problem: Respiratory/Oxygenation Function Post-Surgical  Goal: Patient will achieve/maintain normal respiratory rate/effort  Outcome: Progressing

## 2024-01-31 NOTE — LACTATION NOTE
This note was copied from a baby's chart.  I visited briefly with Martha this morning to determine how she is doing with breast feeding. She reports that he has had a few good feedings she thinks. She had a lot of struggles with her previous babies and ended up mostly pumping for a few months.     I reassured her that all of our staff nurses here are very helpful with breast feeding if she would like some assistance.Support offered to her during her stay.     Baby Octaviano placed skin to skin on Kelli chest in an upright position as he was spitting up a moderate amount of thick, clear fluid.    Encouraged mother to keep baby skin to skin and observe for feeding cues, while taking into consideration other normal bodily functions that may prohibit baby wanting to latch or feed at times.    I encouraged review of the parent education pamphlet with breast feeding and safe sleep information.    I encouraged them to call for assistance with latch as needed.

## 2024-01-31 NOTE — PROGRESS NOTES
1900- Full report rec'd from Peggy MARCELO, pt care assumed at this time. Pt is complete and +1 station and feeling pressure to push. Pt educated on the pushing process at this time. Pt verbalizes understanding and practice pushing began with pt at 1916.     1930- Dr. Peterson at  for delivery at this time.     2200- Pt transferred to PPU via wheelchair with baby in arms and FOB following behind with pt belongings. Full report given to Sadie MARCELO, pt care relinquished at this time.

## 2024-01-31 NOTE — PROGRESS NOTES
1505- Report received.  at 37-2 admitted for active labor. On Pitocin 2 mu/min. Recent AROM/SVE 6/100/-2, clear. Feeling some discomfort with contractions. Category 1 FHT. Care assumed. POC discussed.   1513- SVE unchanged. Position changed and PCEA bolus button utilized.   - Call to Dr. Reddy for continued discomfort with ctx despite pos change and bolus button x 3, otherwise numb. MD to evaluate.   - Status update to Dr. Peterson regarding last SVE, ctx, concentrated urine, IVF bolus, Pit. SVE by MD- ant lip. Urine less concentrated. Still feeling ctx but improved following Anesthesia bolus.

## 2024-01-31 NOTE — PROGRESS NOTES
Assessment completed WDL. Pt states that pain is controlled with current medications. Plan of care discussed. Pt encouraged to call with needs.

## 2024-01-31 NOTE — PROGRESS NOTES
Fatou RN brought pt from labor and delivery. ID bands and cuddles checked and verified with labor and delivery nurse, Fatou . Patient oriented to room and surroundings. Bulb syring demonstration. Educated on emergency call light. ID bands and security issues discussed. Educated about the pink photo ID name badges. Educated about not sleeping with infant. Assessment completed on patient. WDL. Discussed pain management. IV without redness and swelling. FOB at bedside. Encourage pt to call for any needs.

## 2024-02-01 VITALS
HEART RATE: 79 BPM | HEIGHT: 66 IN | DIASTOLIC BLOOD PRESSURE: 62 MMHG | TEMPERATURE: 98 F | RESPIRATION RATE: 18 BRPM | WEIGHT: 248 LBS | SYSTOLIC BLOOD PRESSURE: 114 MMHG | BODY MASS INDEX: 39.86 KG/M2 | OXYGEN SATURATION: 96 %

## 2024-02-01 PROCEDURE — 700102 HCHG RX REV CODE 250 W/ 637 OVERRIDE(OP): Performed by: OBSTETRICS & GYNECOLOGY

## 2024-02-01 PROCEDURE — A9270 NON-COVERED ITEM OR SERVICE: HCPCS | Performed by: OBSTETRICS & GYNECOLOGY

## 2024-02-01 RX ADMIN — IBUPROFEN 800 MG: 800 TABLET, FILM COATED ORAL at 06:50

## 2024-02-01 RX ADMIN — ACETAMINOPHEN 1000 MG: 500 TABLET ORAL at 07:50

## 2024-02-01 ASSESSMENT — EDINBURGH POSTNATAL DEPRESSION SCALE (EPDS)
I HAVE BEEN SO UNHAPPY THAT I HAVE HAD DIFFICULTY SLEEPING: NOT AT ALL
I HAVE BLAMED MYSELF UNNECESSARILY WHEN THINGS WENT WRONG: YES, SOME OF THE TIME
I HAVE FELT SCARED OR PANICKY FOR NO GOOD REASON: NO, NOT AT ALL
THINGS HAVE BEEN GETTING ON TOP OF ME: NO, MOST OF THE TIME I HAVE COPED QUITE WELL
I HAVE BEEN SO UNHAPPY THAT I HAVE BEEN CRYING: NO, NEVER
THE THOUGHT OF HARMING MYSELF HAS OCCURRED TO ME: NEVER
I HAVE BEEN ANXIOUS OR WORRIED FOR NO GOOD REASON: NO, NOT AT ALL
I HAVE LOOKED FORWARD WITH ENJOYMENT TO THINGS: AS MUCH AS I EVER DID
I HAVE BEEN ABLE TO LAUGH AND SEE THE FUNNY SIDE OF THINGS: AS MUCH AS I ALWAYS COULD
I HAVE FELT SAD OR MISERABLE: NO, NOT AT ALL

## 2024-02-01 ASSESSMENT — PAIN DESCRIPTION - PAIN TYPE
TYPE: ACUTE PAIN
TYPE: ACUTE PAIN

## 2024-02-01 NOTE — DISCHARGE PLANNING
Meds-to-Beds: Discharge prescription orders listed below delivered to patient's bedside. RN notified. Patient counseled.      Current Outpatient Medications   Medication Sig Dispense Refill    ibuprofen (MOTRIN) 800 MG Tab Take 1 Tablet by mouth every 8 hours as needed for Moderate Pain. 30 Tablet 1      Umu Tovar, PharmD

## 2024-02-01 NOTE — LACTATION NOTE
Martha reports that she is breastfeeding without difficulty or discomfort. She plans to supplement with formula until her milk is better established. Discussion with Martha regarding milk supply.    Resources for out-patient support discussed.

## 2024-02-01 NOTE — PROGRESS NOTES
Report received from kvng MARCELO. Pt assessment complete. Reviewed POC for this evening. Assisted with breastfeeding. MOB was able to latch baby independently, but baby not latching for more than a minute. Pt requesting to give formula at this time; will still breastfeed throughout the night. Educated pt on the use of formula, feeding frequency, amount to be given, and preparation of bottle. Pt given Motrin for cramping. Call light is within reach.

## 2024-02-01 NOTE — CARE PLAN
Problem: Altered Physiologic Condition  Goal: Patient physiologically stable as evidenced by normal lochia, palpable uterine involution and vitals within normal limits  Outcome: Progressing     Problem: Infection - Postpartum  Goal: Postpartum patient will be free of signs and symptoms of infection  Outcome: Progressing   The patient is Stable - Low risk of patient condition declining or worsening    Shift Goals  Clinical Goals: pain control; lochia WDL  Patient Goals: Healthy mom and baby.  Family Goals: Rest, bonding    Progress made toward(s) clinical / shift goals:  Pt lochia remains WDL. Pt shows no s/s of infection at this time. Pt has been caring for self and baby throughout the night.     Patient is not progressing towards the following goals:

## 2024-02-01 NOTE — PROGRESS NOTES
0750: Assumed care of patient, assessment completed. Fundus is firm at umbilicus with scant lochia rubra. Patient reports 4/10 pain at this time, medication given see MAR. Plan of care discussed, patient verbalized understanding.

## 2024-02-01 NOTE — DISCHARGE INSTRUCTIONS

## 2024-02-01 NOTE — PROGRESS NOTES
1006: Discharge education provided to patient, including follow up information. All questions answered at this time, patient verbalizes understanding. Paperwork signed and dated at this time.      1030: Patient discharged from unit, escorted by Claribel GUERRA.

## 2024-03-13 NOTE — DISCHARGE INSTRUCTIONS
General Instructions:  If you think you are in labor, time contractions (lying on your left side) from the beginning of one contraction to the beginning of the next contraction for at least one hour.  Increase fluid intake: you should consume 10-12 8 oz glasses of non-caffeinated fluid per day.  Report any pressure or burning on urination to your physician.  Monitor fetal movement: If you notice an absence or decrease in fetal movement, drink a large glass of water and rest on your side.  If there is no increase in movement, call your physician or go to the hospital for further evaluation.  Report any sudden, sharp abdominal pain.  Report any bleeding.  Spotting or pinkish discharge is normal after vaginal exam.  You may also spot after sexual intercourse.    Pre-term Labor (<37 weeks):  Call your physician or return to the hospital if:  You have painless regular contractions more than 4 in one hour.  Your water breaks (remember time and color).  You have menstrual-like cramps, a low dull backache or pressure in your pelvis or back.  Your baby does not move enough to complete the daily kick count (10 movements in 2 hours).  Your baby moves much less often than on the days before or you have not felt your baby move all day.  Please review the MEDICATION LIST section of your AFTER VISIT SUMMARY document.  Take your medication as prescribed    Other Instructions:  Please carefully review your entire AFTER VISIT SUMMARY document for all discharge instructions.   normal

## 2024-06-13 ENCOUNTER — HOSPITAL ENCOUNTER (EMERGENCY)
Facility: MEDICAL CENTER | Age: 34
End: 2024-06-13
Attending: EMERGENCY MEDICINE
Payer: MEDICAID

## 2024-06-13 VITALS
RESPIRATION RATE: 16 BRPM | SYSTOLIC BLOOD PRESSURE: 119 MMHG | DIASTOLIC BLOOD PRESSURE: 69 MMHG | HEIGHT: 66 IN | WEIGHT: 233.69 LBS | TEMPERATURE: 97.9 F | HEART RATE: 79 BPM | OXYGEN SATURATION: 93 % | BODY MASS INDEX: 37.56 KG/M2

## 2024-06-13 DIAGNOSIS — R11.2 NAUSEA VOMITING AND DIARRHEA: ICD-10-CM

## 2024-06-13 DIAGNOSIS — R10.9 ABDOMINAL CRAMPING: ICD-10-CM

## 2024-06-13 DIAGNOSIS — R19.7 NAUSEA VOMITING AND DIARRHEA: ICD-10-CM

## 2024-06-13 LAB
ALBUMIN SERPL BCP-MCNC: 4.7 G/DL (ref 3.2–4.9)
ALBUMIN/GLOB SERPL: 1.6 G/DL
ALP SERPL-CCNC: 42 U/L (ref 30–99)
ALT SERPL-CCNC: 57 U/L (ref 2–50)
ANION GAP SERPL CALC-SCNC: 17 MMOL/L (ref 7–16)
APPEARANCE UR: CLEAR
AST SERPL-CCNC: 26 U/L (ref 12–45)
BASOPHILS # BLD AUTO: 0.5 % (ref 0–1.8)
BASOPHILS # BLD: 0.07 K/UL (ref 0–0.12)
BILIRUB SERPL-MCNC: 0.4 MG/DL (ref 0.1–1.5)
BILIRUB UR QL STRIP.AUTO: ABNORMAL
BUN SERPL-MCNC: 15 MG/DL (ref 8–22)
CALCIUM ALBUM COR SERPL-MCNC: 9.2 MG/DL (ref 8.5–10.5)
CALCIUM SERPL-MCNC: 9.8 MG/DL (ref 8.5–10.5)
CHLORIDE SERPL-SCNC: 102 MMOL/L (ref 96–112)
CO2 SERPL-SCNC: 20 MMOL/L (ref 20–33)
COLOR UR: YELLOW
CREAT SERPL-MCNC: 0.72 MG/DL (ref 0.5–1.4)
EOSINOPHIL # BLD AUTO: 0.17 K/UL (ref 0–0.51)
EOSINOPHIL NFR BLD: 1.1 % (ref 0–6.9)
ERYTHROCYTE [DISTWIDTH] IN BLOOD BY AUTOMATED COUNT: 43.6 FL (ref 35.9–50)
GFR SERPLBLD CREATININE-BSD FMLA CKD-EPI: 111 ML/MIN/1.73 M 2
GLOBULIN SER CALC-MCNC: 2.9 G/DL (ref 1.9–3.5)
GLUCOSE SERPL-MCNC: 118 MG/DL (ref 65–99)
GLUCOSE UR STRIP.AUTO-MCNC: NEGATIVE MG/DL
HCG SERPL QL: NEGATIVE
HCT VFR BLD AUTO: 41.3 % (ref 37–47)
HGB BLD-MCNC: 14.2 G/DL (ref 12–16)
IMM GRANULOCYTES # BLD AUTO: 0.08 K/UL (ref 0–0.11)
IMM GRANULOCYTES NFR BLD AUTO: 0.5 % (ref 0–0.9)
KETONES UR STRIP.AUTO-MCNC: 40 MG/DL
LEUKOCYTE ESTERASE UR QL STRIP.AUTO: NEGATIVE
LIPASE SERPL-CCNC: 26 U/L (ref 11–82)
LYMPHOCYTES # BLD AUTO: 1.26 K/UL (ref 1–4.8)
LYMPHOCYTES NFR BLD: 8.2 % (ref 22–41)
MCH RBC QN AUTO: 27.9 PG (ref 27–33)
MCHC RBC AUTO-ENTMCNC: 34.4 G/DL (ref 32.2–35.5)
MCV RBC AUTO: 81.1 FL (ref 81.4–97.8)
MICRO URNS: ABNORMAL
MONOCYTES # BLD AUTO: 0.8 K/UL (ref 0–0.85)
MONOCYTES NFR BLD AUTO: 5.2 % (ref 0–13.4)
NEUTROPHILS # BLD AUTO: 13 K/UL (ref 1.82–7.42)
NEUTROPHILS NFR BLD: 84.5 % (ref 44–72)
NITRITE UR QL STRIP.AUTO: NEGATIVE
NRBC # BLD AUTO: 0 K/UL
NRBC BLD-RTO: 0 /100 WBC (ref 0–0.2)
PH UR STRIP.AUTO: 6 [PH] (ref 5–8)
PLATELET # BLD AUTO: 314 K/UL (ref 164–446)
PMV BLD AUTO: 9.4 FL (ref 9–12.9)
POTASSIUM SERPL-SCNC: 3.6 MMOL/L (ref 3.6–5.5)
PROT SERPL-MCNC: 7.6 G/DL (ref 6–8.2)
PROT UR QL STRIP: NEGATIVE MG/DL
RBC # BLD AUTO: 5.09 M/UL (ref 4.2–5.4)
RBC UR QL AUTO: NEGATIVE
SODIUM SERPL-SCNC: 139 MMOL/L (ref 135–145)
SP GR UR STRIP.AUTO: >=1.03
UROBILINOGEN UR STRIP.AUTO-MCNC: 0.2 MG/DL
WBC # BLD AUTO: 15.4 K/UL (ref 4.8–10.8)

## 2024-06-13 PROCEDURE — 700111 HCHG RX REV CODE 636 W/ 250 OVERRIDE (IP): Mod: JZ,UD | Performed by: EMERGENCY MEDICINE

## 2024-06-13 PROCEDURE — 80053 COMPREHEN METABOLIC PANEL: CPT

## 2024-06-13 PROCEDURE — 83690 ASSAY OF LIPASE: CPT

## 2024-06-13 PROCEDURE — 96374 THER/PROPH/DIAG INJ IV PUSH: CPT

## 2024-06-13 PROCEDURE — 36415 COLL VENOUS BLD VENIPUNCTURE: CPT

## 2024-06-13 PROCEDURE — 81003 URINALYSIS AUTO W/O SCOPE: CPT

## 2024-06-13 PROCEDURE — 96375 TX/PRO/DX INJ NEW DRUG ADDON: CPT

## 2024-06-13 PROCEDURE — 85025 COMPLETE CBC W/AUTO DIFF WBC: CPT

## 2024-06-13 PROCEDURE — 84703 CHORIONIC GONADOTROPIN ASSAY: CPT

## 2024-06-13 PROCEDURE — 99285 EMERGENCY DEPT VISIT HI MDM: CPT

## 2024-06-13 PROCEDURE — 700105 HCHG RX REV CODE 258: Mod: UD | Performed by: EMERGENCY MEDICINE

## 2024-06-13 RX ORDER — DICYCLOMINE HCL 20 MG
20 TABLET ORAL EVERY 6 HOURS PRN
Qty: 12 TABLET | Refills: 0 | Status: SHIPPED | OUTPATIENT
Start: 2024-06-13 | End: 2024-06-16

## 2024-06-13 RX ORDER — MORPHINE SULFATE 4 MG/ML
4 INJECTION INTRAVENOUS ONCE
Status: COMPLETED | OUTPATIENT
Start: 2024-06-13 | End: 2024-06-13

## 2024-06-13 RX ORDER — KETOROLAC TROMETHAMINE 15 MG/ML
15 INJECTION, SOLUTION INTRAMUSCULAR; INTRAVENOUS ONCE
Status: COMPLETED | OUTPATIENT
Start: 2024-06-13 | End: 2024-06-13

## 2024-06-13 RX ORDER — ONDANSETRON 2 MG/ML
4 INJECTION INTRAMUSCULAR; INTRAVENOUS ONCE
Status: COMPLETED | OUTPATIENT
Start: 2024-06-13 | End: 2024-06-13

## 2024-06-13 RX ORDER — ONDANSETRON 4 MG/1
4 TABLET, ORALLY DISINTEGRATING ORAL EVERY 6 HOURS PRN
Qty: 10 TABLET | Refills: 0 | Status: SHIPPED | OUTPATIENT
Start: 2024-06-13

## 2024-06-13 RX ORDER — SODIUM CHLORIDE, SODIUM LACTATE, POTASSIUM CHLORIDE, CALCIUM CHLORIDE 600; 310; 30; 20 MG/100ML; MG/100ML; MG/100ML; MG/100ML
1000 INJECTION, SOLUTION INTRAVENOUS ONCE
Status: COMPLETED | OUTPATIENT
Start: 2024-06-13 | End: 2024-06-13

## 2024-06-13 RX ADMIN — SODIUM CHLORIDE, POTASSIUM CHLORIDE, SODIUM LACTATE AND CALCIUM CHLORIDE 1000 ML: 600; 310; 30; 20 INJECTION, SOLUTION INTRAVENOUS at 21:06

## 2024-06-13 RX ADMIN — ONDANSETRON 4 MG: 2 INJECTION INTRAMUSCULAR; INTRAVENOUS at 21:06

## 2024-06-13 RX ADMIN — KETOROLAC TROMETHAMINE 15 MG: 15 INJECTION, SOLUTION INTRAMUSCULAR; INTRAVENOUS at 21:42

## 2024-06-13 RX ADMIN — FENTANYL CITRATE 50 MCG: 50 INJECTION, SOLUTION INTRAMUSCULAR; INTRAVENOUS at 21:07

## 2024-06-13 RX ADMIN — MORPHINE SULFATE 4 MG: 4 INJECTION INTRAVENOUS at 22:26

## 2024-06-13 ASSESSMENT — FIBROSIS 4 INDEX: FIB4 SCORE: .985900603509299004

## 2024-06-14 NOTE — ED TRIAGE NOTES
Chief Complaint   Patient presents with    Abdominal Pain     Since 4 pm with nausea and vomiting (2x), lightheadedness    Denied any fever, dysuria, diarrhea       Pain: 9/10    Pt came in to triage ambulatory with steady gait for the above complaints.     Pt is alert and oriented x 4, speaking in full sentences, follows commands and responds appropriately to questions.     Respirations are even and unlabored.    Pt placed in lobby. Pt educated on triage process.     Pt encouraged to inform staff for any changes in condition or if needs help while waiting to be room in.    Vitals:    06/13/24 1944   BP: 118/76   Pulse: 82   Resp: 16   Temp: 36.9 °C (98.4 °F)   SpO2: 95%

## 2024-06-14 NOTE — ED PROVIDER NOTES
"ED Provider Note    CHIEF COMPLAINT  Chief Complaint   Patient presents with    Abdominal Pain     Since 4 pm with nausea and vomiting (2x), lightheadedness    Denied any fever, dysuria, diarrhea         HPI/ROS  LIMITATION TO HISTORY   Select: : None  OUTSIDE HISTORIAN(S):  lazaro Rubio is a 34 y.o. female who presents to the emerged part with chief complaint of 1 day of generalized abdominal cramping nausea vomiting and diarrhea.  The patient states there is no blood in her stool or emesis.  They are suspecting is food poisoning because nobody also sick at home.  She has had no headache but she feels lightheaded when she stands up.  She endorses just generalized abdominal discomfort has not taken anything at home for the symptoms.  She denies painful urination.    She is approximate 4 months postpartum and she is not breast-feeding    PAST MEDICAL HISTORY   has a past medical history of Asthma.    SURGICAL HISTORY   has a past surgical history that includes dilation and evacuation (N/A, 12/3/2022).    FAMILY HISTORY  History reviewed. No pertinent family history.    SOCIAL HISTORY  Social History     Tobacco Use    Smoking status: Never    Smokeless tobacco: Never   Vaping Use    Vaping status: Every Day   Substance and Sexual Activity    Alcohol use: Yes     Comment: ocassional    Drug use: Not Currently    Sexual activity: Not on file       CURRENT MEDICATIONS  Home Medications       Reviewed by Jason Garrido R.N. (Registered Nurse) on 06/13/24 at 1950  Med List Status: Partial     Medication Last Dose Status   ibuprofen (MOTRIN) 800 MG Tab  Active   Prenatal Vit-Fe Fumarate-FA (PRENATAL PO)  Active                    ALLERGIES  Allergies   Allergen Reactions    Codeine Unspecified     \"Chest pain\"    Penicillins Rash             PHYSICAL EXAM  VITAL SIGNS: /76   Pulse 82   Temp 36.9 °C (98.4 °F) (Oral)   Resp 16   Ht 1.676 m (5' 6\")   Wt 106 kg (233 lb 11 oz)   SpO2 95%   BMI " 37.72 kg/m²    Pulse OX: Pulse Oxygen level is within normal limit  Constitutional: Alert in no apparent distress.  HENT: Normocephalic, Atraumatic, dry mucous membranes  Eyes: PERound. Conjunctiva normal, non-icteric.   Heart: Regular rate and rhythm, intact distal pulses   Lungs: Symmetrical movement, no resp distress   Abdomen: Mild generalized tenderness non-distended, normal bowel sounds  EXT/Back no edema  Skin: Warm, Dry, No erythema, No rash.   Neurologic: Alert and oriented, Grossly non-focal.       EKG/LABS  Labs Reviewed   CBC WITH DIFFERENTIAL - Abnormal; Notable for the following components:       Result Value    WBC 15.4 (*)     MCV 81.1 (*)     Neutrophils-Polys 84.50 (*)     Lymphocytes 8.20 (*)     Neutrophils (Absolute) 13.00 (*)     All other components within normal limits   COMP METABOLIC PANEL - Abnormal; Notable for the following components:    Anion Gap 17.0 (*)     Glucose 118 (*)     ALT(SGPT) 57 (*)     All other components within normal limits   URINALYSIS,CULTURE IF INDICATED - Abnormal; Notable for the following components:    Ketones 40 (*)     Bilirubin Small (*)     All other components within normal limits   LIPASE   HCG QUAL SERUM   ESTIMATED GFR         I have independently interpreted this EKG        COURSE & MEDICAL DECISION MAKING    ASSESSMENT, COURSE AND PLAN  Care Narrative:     Patient is a 34-year-old female mild generalized abdominal tenderness nausea vomiting and diarrhea either I suspect foodborne illness versus viral gastroenteritis.  She will be treated here in the emergency department with Zofran Toradol and fentanyl as well as some IV fluids.  Reevaluation for bacterial infection although I doubt appendicitis colitis or diverticulitis.    DISPOSITION AND DISCUSSIONS  10:04 PM  Reassessed patient the bedside she still feeling some abdominal comfort despite the Toradol and fentanyl, show given a little bit of morphine repeat exam still reveals just a mildly  generalized tender abdomen without rebound guarding or any focal findings.  We discussed her laboratory analysis consistent likely with a viral infection without severe acidosis or GUILLAUME urinalysis consistent with ketones and dehydration but otherwise I have low concern for bacterial infection    11:10 PM I reassessed patient at the bedside she feeling proved after the morphine she feels comfortable going home we discussed Zofran and Bentyl gentle liquid diet over the next few days and return precautions to the ED.    Hydration: Based on the patient's presentation of Acute Vomiting and Dehydration the patient was given IV fluids. IV Hydration was used because oral hydration failed due to vomiting. Upon recheck following hydration, the patient was improved.        I have discussed management of the patient with the following physicians and PALLAVI's:  none    Discussion of management with other QHP or appropriate source(s): None     Escalation of care considered, and ultimately not performed:diagnostic imaging and acute inpatient care management, however at this time, the patient is most appropriate for outpatient management    Barriers to care at this time, including but not limited to:  na .     Decision tools and prescription drugs considered including, but not limited to: Medication modification Zofran and Bentyl were prescribed .    The patient will return for new or worsening symptoms and is stable at the time of discharge.    The patient is referred to a primary physician for blood pressure management, diabetic screening, and for all other preventative health concerns.    DISPOSITION:  Patient will be discharged home in stable condition.    FOLLOW UP:  [facility], Emergency Dept  [address] [facility] [facility] [id number] [phone]    If symptoms worsen      OUTPATIENT MEDICATIONS:  Discharge Medication List as of 6/13/2024 10:59 PM        START taking these medications    Details   ondansetron  (ZOFRAN ODT) 4 MG TABLET DISPERSIBLE Take 1 Tablet by mouth every 6 hours as needed for Nausea/Vomiting., Disp-10 Tablet, R-0, Normal      dicyclomine (BENTYL) 20 MG Tab Take 1 Tablet by mouth every 6 hours as needed (abdominal cramping) for up to 3 days., Disp-12 Tablet, R-0, Normal               FINAL DIAGNOSIS  1. Nausea vomiting and diarrhea    2. Abdominal cramping           Electronically signed by: Sue Huggins M.D., 6/13/2024 8:37 PM

## 2024-07-31 ENCOUNTER — OFFICE VISIT (OUTPATIENT)
Dept: URGENT CARE | Facility: CLINIC | Age: 34
End: 2024-07-31
Payer: MEDICAID

## 2024-07-31 VITALS
HEART RATE: 72 BPM | SYSTOLIC BLOOD PRESSURE: 120 MMHG | DIASTOLIC BLOOD PRESSURE: 84 MMHG | HEIGHT: 66 IN | OXYGEN SATURATION: 96 % | BODY MASS INDEX: 37.45 KG/M2 | RESPIRATION RATE: 17 BRPM | WEIGHT: 233 LBS | TEMPERATURE: 97.4 F

## 2024-07-31 DIAGNOSIS — M62.838 NECK MUSCLE SPASM: ICD-10-CM

## 2024-07-31 RX ORDER — CYCLOBENZAPRINE HCL 5 MG
5 TABLET ORAL 3 TIMES DAILY PRN
Qty: 30 TABLET | Refills: 0 | Status: SHIPPED | OUTPATIENT
Start: 2024-07-31

## 2024-07-31 RX ORDER — KETOROLAC TROMETHAMINE 15 MG/ML
15 INJECTION, SOLUTION INTRAMUSCULAR; INTRAVENOUS ONCE
Status: COMPLETED | OUTPATIENT
Start: 2024-07-31 | End: 2024-07-31

## 2024-07-31 RX ORDER — MELOXICAM 15 MG/1
15 TABLET ORAL DAILY
Qty: 30 TABLET | Refills: 0 | Status: SHIPPED | OUTPATIENT
Start: 2024-07-31

## 2024-07-31 RX ADMIN — KETOROLAC TROMETHAMINE 15 MG: 15 INJECTION, SOLUTION INTRAMUSCULAR; INTRAVENOUS at 15:56

## 2024-07-31 ASSESSMENT — ENCOUNTER SYMPTOMS
WEAKNESS: 0
NUMBNESS: 0
FEVER: 0
MYALGIAS: 1
NECK PAIN: 1

## 2024-07-31 ASSESSMENT — FIBROSIS 4 INDEX: FIB4 SCORE: 0.39

## 2024-08-14 ENCOUNTER — APPOINTMENT (OUTPATIENT)
Dept: PHYSICAL MEDICINE AND REHAB | Facility: MEDICAL CENTER | Age: 34
End: 2024-08-14
Payer: MEDICAID

## 2024-08-26 ENCOUNTER — OFFICE VISIT (OUTPATIENT)
Dept: PHYSICAL MEDICINE AND REHAB | Facility: MEDICAL CENTER | Age: 34
End: 2024-08-26
Payer: MEDICAID

## 2024-08-26 VITALS
HEIGHT: 67 IN | SYSTOLIC BLOOD PRESSURE: 133 MMHG | TEMPERATURE: 97.9 F | OXYGEN SATURATION: 97 % | WEIGHT: 243.61 LBS | DIASTOLIC BLOOD PRESSURE: 93 MMHG | BODY MASS INDEX: 38.24 KG/M2 | HEART RATE: 84 BPM

## 2024-08-26 DIAGNOSIS — M54.12 CERVICAL RADICULOPATHY: ICD-10-CM

## 2024-08-26 DIAGNOSIS — R20.0 RIGHT ARM NUMBNESS: ICD-10-CM

## 2024-08-26 DIAGNOSIS — E66.9 OBESITY (BMI 30-39.9): ICD-10-CM

## 2024-08-26 PROCEDURE — 3080F DIAST BP >= 90 MM HG: CPT | Performed by: PHYSICAL MEDICINE & REHABILITATION

## 2024-08-26 PROCEDURE — 1125F AMNT PAIN NOTED PAIN PRSNT: CPT | Performed by: PHYSICAL MEDICINE & REHABILITATION

## 2024-08-26 PROCEDURE — 99204 OFFICE O/P NEW MOD 45 MIN: CPT | Performed by: PHYSICAL MEDICINE & REHABILITATION

## 2024-08-26 PROCEDURE — 3075F SYST BP GE 130 - 139MM HG: CPT | Performed by: PHYSICAL MEDICINE & REHABILITATION

## 2024-08-26 PROCEDURE — G2211 COMPLEX E/M VISIT ADD ON: HCPCS | Performed by: PHYSICAL MEDICINE & REHABILITATION

## 2024-08-26 RX ORDER — CYCLOBENZAPRINE HCL 10 MG
10 TABLET ORAL 3 TIMES DAILY PRN
Qty: 90 TABLET | Refills: 3 | Status: SHIPPED | OUTPATIENT
Start: 2024-08-26

## 2024-08-26 RX ORDER — MELOXICAM 15 MG/1
15 TABLET ORAL
Qty: 90 TABLET | Refills: 0 | Status: SHIPPED | OUTPATIENT
Start: 2024-08-26

## 2024-08-26 ASSESSMENT — FIBROSIS 4 INDEX: FIB4 SCORE: 0.39

## 2024-08-26 ASSESSMENT — PATIENT HEALTH QUESTIONNAIRE - PHQ9: CLINICAL INTERPRETATION OF PHQ2 SCORE: 0

## 2024-08-26 ASSESSMENT — PAIN SCALES - GENERAL: PAINLEVEL: 6=MODERATE PAIN

## 2024-08-26 NOTE — PROGRESS NOTES
New patient note    Interventional spine and Pain  Physiatry (physical medicine and  Rehabilitation)     Date of service: See epic    Chief complaint:   Chief Complaint   Patient presents with    New Patient     Neck Spasm        Referring provider: Martell Dsouza A.P.*     HISTORY    HPI: Martha Rubio 34 y.o.  who presents today with Diagnoses of Cervical radiculopathy, Right arm numbness, and BMI 30-39.9 were pertinent to this visit.    HPI    Verbal consent was obtained for Raad copilot: Yes      History of Present Illness  The patient is a 34-year-old female who presents for evaluation of right-sided neck pain radiating down the right arm towards the hand, including the thumb, with associated numbness, tingling, and a burning sensation.    The pain ranges from 6 out of 10 in intensity up to 8 out of 10. It is worse with turning the head side to side and is causing functional deficits and difficulty with activities of daily living (ADLs), including upper body dressing, bed mobility, and sleep. The pain has intensified over the past 3 months.    She initially experienced a pinched nerve or crick in her neck, which led her to seek massage therapy. However, she woke up the following day with exacerbated symptoms. She then sought chiropractic treatment, but this did not alleviate her discomfort. A week later, she visited urgent care and received medication. The pain originated in her neck, which has since improved but remains tight. The primary source of her discomfort now is her right arm, which is extremely sore. She also reports numbness in her right thumb. It is important to note that her pain is localized to the right side of her neck and right arm, with no reported pain on the left side.    No imaging studies have been conducted to date. She expresses a desire for imaging to further investigate her condition. She experiences mild anxiety during MRI scans but does not consider herself  "claustrophobic. She has undergone an MRI scan in the past.    She also requests refills for her meloxicam and Flexeril prescriptions.              Medical records review:  I reviewed the note from the referring provider Martell Dsouza A.P.* including the note dated 7/31/2024 Toradol injection given, Flexeril given, meloxicam given and referred to physical therapy and pain clinic.           ROS:   Red Flags ROS:   Fever, Chills, Sweats: Denies  Involuntary Weight Loss: Denies  Bladder Incontinence: Denies  Bowel Incontinence: denies  Saddle Anesthesia: Denies    All other systems reviewed and negative.       PMHx:   Past Medical History:   Diagnosis Date    Asthma          No current outpatient medications on file prior to visit.     No current facility-administered medications on file prior to visit.        PSHx:   Past Surgical History:   Procedure Laterality Date    DILATION AND EVACUATION N/A 12/3/2022    Procedure: DILATION AND EVACUATION, UTERUS;  Surgeon: Sue Hardin M.D.;  [facility];  Service: Obstetrics       Family history   History reviewed. No pertinent family history.      Medications: reviewed on epic.   Outpatient Medications Marked as Taking for the 8/26/24 encounter (Office Visit) with Kosta Redding M.D.   Medication Sig Dispense Refill    cyclobenzaprine (FLEXERIL) 10 mg Tab Take 1 Tablet by mouth 3 times a day as needed for Mild Pain, Moderate Pain or Muscle Spasms. 90 Tablet 3    meloxicam (MOBIC) 15 MG tablet Take 1 Tablet by mouth 1 time a day as needed (pain). Do not take other NSAIDs. 90 Tablet 0        Allergies:   Allergies   Allergen Reactions    Codeine Unspecified     \"Chest pain\"    Penicillins Rash             Social Hx:   Social History     Socioeconomic History    Marital status: Single     Spouse name: Not on file    Number of children: Not on file    Years of education: Not on file    Highest education level: Not on file   Occupational " "History    Not on file   Tobacco Use    Smoking status: Never    Smokeless tobacco: Never   Vaping Use    Vaping status: Every Day   Substance and Sexual Activity    Alcohol use: Yes     Comment: ocassional    Drug use: Not Currently    Sexual activity: Not on file   Other Topics Concern    Not on file   Social History Narrative    Not on file     Social Determinants of Health     Financial Resource Strain: Not on File (2/10/2021)    Received from [facility]    Financial Resource Strain     Financial Resource Strain: 0   Food Insecurity: Not on File (2/10/2021)    [facility] from [facility]    Food Insecurity     Food: 0   Transportation Needs: Not on File (2/10/2021)    Received from [facility]    Transportation Needs     Transportation: 0   Physical Activity: Not on File (2/10/2021)    Received from [facility]    Physical Activity     Physical Activity: 0   Stress: Not on File (2/10/2021)    Received from [facility]    Stress     Stress: 0   Social Connections: Not on File (2/10/2021)    Received from [facility]    Social Connections     Social Connections and Isolation: 0   Intimate Partner Violence: Not on file   Housing Stability: Not on File (2/10/2021)    Received from [facility]    Housing Stability     Housin         EXAMINATION     Physical Exam:   Vitals: BP (!) 133/93 (BP Location: Left arm, Patient Position: Sitting, BP Cuff Size: Adult)   Pulse 84   Temp 36.6 °C (97.9 °F) (Temporal)   Ht 1.689 m (5' 6.5\")   Wt 110 kg (243 lb 9.7 oz)   SpO2 97%     Constitutional:   Body Habitus: Body mass index is 38.73 kg/m².  Cooperation: Fully cooperates with exam  Appearance: Well-groomed, well-nourished, not disheveled     Eyes: No scleral icterus to suggest severe liver disease, no proptosis to suggest severe hyperthyroid    ENT -no obvious auditory deficits, no obvious tongue lesions, tongue midline, no facial droop     Skin -no rashes or lesions noted     Respiratory-  breathing comfortable " on room air, no audible wheezing    Cardiovascular- capillary refills less than 2 seconds.     Psychiatric- alert and oriented ×3. Normal affect.     Gait - normal gait, no use of ambulatory device, nonantalgic. .     Musculoskeletal and Neuro -     Physical Exam         Cervical spine   Inspection: No deformities of the skin over the cervical spine. No rashes or lesions.    decreased  active range of motion in all directions, with  pain      Spurling’s sign: positive right, negative left    No signs of muscular atrophy in bilateral upper extremities     No tenderness to palpation of the cervical spine    Positive for tenderness to palpation on the RIGHT side in the cervical facets.       Key points for the international standards for neurological classification of spinal cord injury (ISNCSCI) to light touch.     Dermatome R L   C4 2 2   C5 2 2   C6 1 2   C7 1 2   C8 2 2   T1 2 2   T2 2 2                                     Motor Exam Upper Extremities   ? Myotome R L   Shoulder flexion C5 5 5   Elbow flexion C5 5 5   Wrist extension C6 5 5   Elbow extension C7 4 5   Finger flexion C8 5 5   Finger abduction T1 5 5     Reflexes  Cole’s sign negative bilaterally                MEDICAL DECISION MAKING    Medical records review: see under HPI section.     DATA    Labs:   Lab Results   Component Value Date/Time    SODIUM 139 06/13/2024 08:37 PM    POTASSIUM 3.6 06/13/2024 08:37 PM    CHLORIDE 102 06/13/2024 08:37 PM    CO2 20 06/13/2024 08:37 PM    ANION 17.0 (H) 06/13/2024 08:37 PM    GLUCOSE 118 (H) 06/13/2024 08:37 PM    BUN 15 06/13/2024 08:37 PM    CREATININE 0.72 06/13/2024 08:37 PM    CALCIUM 9.8 06/13/2024 08:37 PM    ASTSGOT 26 06/13/2024 08:37 PM    ALTSGPT 57 (H) 06/13/2024 08:37 PM    TBILIRUBIN 0.4 06/13/2024 08:37 PM    ALBUMIN 4.7 06/13/2024 08:37 PM    TOTPROTEIN 7.6 06/13/2024 08:37 PM    GLOBULIN 2.9 06/13/2024 08:37 PM    AGRATIO 1.6 06/13/2024 08:37 PM   ]    Lab Results   Component Value Date/Time  "   PROTHROMBTM 13.9 12/03/2022 08:00 AM    INR 1.08 12/03/2022 08:00 AM        Lab Results   Component Value Date/Time    WBC 15.4 (H) 06/13/2024 08:37 PM    RBC 5.09 06/13/2024 08:37 PM    HEMOGLOBIN 14.2 06/13/2024 08:37 PM    HEMATOCRIT 41.3 06/13/2024 08:37 PM    MCV 81.1 (L) 06/13/2024 08:37 PM    MCH 27.9 06/13/2024 08:37 PM    MCHC 34.4 06/13/2024 08:37 PM    MPV 9.4 06/13/2024 08:37 PM    NEUTSPOLYS 84.50 (H) 06/13/2024 08:37 PM    LYMPHOCYTES 8.20 (L) 06/13/2024 08:37 PM    MONOCYTES 5.20 06/13/2024 08:37 PM    EOSINOPHILS 1.10 06/13/2024 08:37 PM    BASOPHILS 0.50 06/13/2024 08:37 PM    HYPOCHROMIA 1+ 12/02/2022 11:50 AM    ANISOCYTOSIS 1+ 12/05/2022 08:54 AM        No results found for: \"HBA1C\"     Imaging:   I personally reviewed following images, these are my reads      Results           IMAGING radiology reads. I reviewed the following radiology reads                                                                                                               Diagnosis   Visit Diagnoses     ICD-10-CM   1. Cervical radiculopathy  M54.12   2. Right arm numbness  R20.0   3. BMI 30-39.9  E66.9           ASSESSMENT AND PLAN:  Martha Mishraerine Ramiro 34 y.o. female      Martha was seen today for new patient.    Diagnoses and all orders for this visit:    Cervical radiculopathy  -     Referral to Physical Therapy  -     DX-CERVICAL SPINE-2 OR 3 VIEWS; Future  -     MR-CERVICAL SPINE-W/O; Future  -     cyclobenzaprine (FLEXERIL) 10 mg Tab; Take 1 Tablet by mouth 3 times a day as needed for Mild Pain, Moderate Pain or Muscle Spasms.  -     meloxicam (MOBIC) 15 MG tablet; Take 1 Tablet by mouth 1 time a day as needed (pain). Do not take other NSAIDs.  -     Referral to establish with PCP    Right arm numbness  -     Referral to Physical Therapy  -     DX-CERVICAL SPINE-2 OR 3 VIEWS; Future  -     MR-CERVICAL SPINE-W/O; Future  -     cyclobenzaprine (FLEXERIL) 10 mg Tab; Take 1 Tablet by mouth 3 times a day as " needed for Mild Pain, Moderate Pain or Muscle Spasms.  -     meloxicam (MOBIC) 15 MG tablet; Take 1 Tablet by mouth 1 time a day as needed (pain). Do not take other NSAIDs.  -     Referral to establish with PCP    BMI 30-39.9  -     Referral to Physical Therapy  -     Referral to establish with PCP        Assessment & Plan  1Right-sided neck pain likely secondary to cervical radiculopathy  Symptoms include pain radiating down the right arm towards the hand, with associated numbness, tingling, and a burning sensation, suggesting a possible pinched nerve, indicative of cervical radiculopathy. Pain intensity ranges from 6 to 8 out of 10 and worsens with head movement, causing functional deficits. An x-ray and MRI of the cervical spine have been ordered to confirm the diagnosis. Depending on the results, treatment options may include injections and physical therapy. Refills for meloxicam 15 mg and Flexeril 10 mg have been provided and sent to the [facility] pharmacy.          Physical therapy: I ordered physical therapy to focus on strengthening and stretching.     home exercise program: I provided the patient with a strengthening and stretching with a home exercise program     Diagnostic workup: As above    Medications:   As above     Interventional program: I would consider the patient for an epidural steroid injection depending on the results of the above         Follow-up: After the above diagnostic studies           Please note that this dictation was created using voice recognition software. I have made every reasonable attempt to correct obvious errors but there may be errors of grammar and content that I may have overlooked prior to finalization of this note.      Kosta Redding MD  Physical Medicine and Rehabilitation  Interventional Spine and Sports Physiatry  [facility]         Martell Calderon A.P.* .

## 2024-09-26 ENCOUNTER — APPOINTMENT (OUTPATIENT)
Dept: PHYSICAL MEDICINE AND REHAB | Facility: MEDICAL CENTER | Age: 34
End: 2024-09-26
Payer: MEDICAID

## 2025-01-31 ENCOUNTER — TELEPHONE (OUTPATIENT)
Dept: SCHEDULING | Facility: IMAGING CENTER | Age: 35
End: 2025-01-31
Payer: MEDICAID

## (undated) DEVICE — VACURETTE 10MM CURVED 10/PKG

## (undated) DEVICE — SET EXTENSION WITH 2 PORTS (48EA/CA) ***PART #2C8610 IS A SUBSTITUTE*****

## (undated) DEVICE — BOTTLE COLLECTION CANISTER LG - (10/PK)

## (undated) DEVICE — BOTTLE SECONDARY SAFE TOUCH - W/SEAL CUP(10/BX)

## (undated) DEVICE — CURRETTE TIP VAC CURV 16MM (10EA/BX)

## (undated) DEVICE — TRAP TISSUE SAFETOUCH

## (undated) DEVICE — HEAD HOLDER JUNIOR/ADULT

## (undated) DEVICE — VACURETTE 12MM CURVED 10/PKG

## (undated) DEVICE — CATHETER IV NON-SAFETY 18 GA X 1 1/4 (50/BX 4BX/CA)

## (undated) DEVICE — TRAY SRGPRP PVP IOD WT PRP - (20/CA)

## (undated) DEVICE — WATER IRRIGATION STERILE 1000ML (12EA/CA)

## (undated) DEVICE — TUBING D & E COLLECTION SET (50EA/PK)

## (undated) DEVICE — PACK ROOM TURNOVER L&D (12/CA)

## (undated) DEVICE — GLOVE BIOGEL SZ 8 SURGICAL PF LTX - (50PR/BX 4BX/CA)

## (undated) DEVICE — TUBING CLEARLINK DUO-VENT - C-FLO (48EA/CA)

## (undated) DEVICE — LACTATED RINGERS INJ 1000 ML - (14EA/CA 60CA/PF)

## (undated) DEVICE — VACURETTE 8MM CURVED 10/PKG

## (undated) DEVICE — VACURETTE 14MM CURVED  1/2IN BASE (10EA/PK)

## (undated) DEVICE — TUBING SCT 18IN BR SFTCH BTL - 10/BX GOES WITH 77350

## (undated) DEVICE — KIT  I.V. START (100EA/CA)

## (undated) DEVICE — VACURETTE 'F' TIP 8MM 10/PKG